# Patient Record
Sex: MALE | Race: WHITE | NOT HISPANIC OR LATINO | URBAN - METROPOLITAN AREA
[De-identification: names, ages, dates, MRNs, and addresses within clinical notes are randomized per-mention and may not be internally consistent; named-entity substitution may affect disease eponyms.]

---

## 2020-10-04 ENCOUNTER — INPATIENT (INPATIENT)
Facility: HOSPITAL | Age: 35
LOS: 3 days | Discharge: HOME | DRG: 853 | End: 2020-10-08
Attending: STUDENT IN AN ORGANIZED HEALTH CARE EDUCATION/TRAINING PROGRAM | Admitting: STUDENT IN AN ORGANIZED HEALTH CARE EDUCATION/TRAINING PROGRAM
Payer: COMMERCIAL

## 2020-10-04 VITALS
SYSTOLIC BLOOD PRESSURE: 139 MMHG | HEIGHT: 72 IN | DIASTOLIC BLOOD PRESSURE: 86 MMHG | TEMPERATURE: 100 F | WEIGHT: 169.98 LBS | HEART RATE: 114 BPM | OXYGEN SATURATION: 98 % | RESPIRATION RATE: 16 BRPM

## 2020-10-04 LAB
ALBUMIN SERPL ELPH-MCNC: 3 G/DL — LOW (ref 3.4–5)
ALBUMIN SERPL ELPH-MCNC: 3.5 G/DL — SIGNIFICANT CHANGE UP (ref 3.4–5)
ALP SERPL-CCNC: 166 U/L — HIGH (ref 40–120)
ALP SERPL-CCNC: 194 U/L — HIGH (ref 40–120)
ALT FLD-CCNC: 32 U/L — SIGNIFICANT CHANGE UP (ref 12–42)
ALT FLD-CCNC: 35 U/L — SIGNIFICANT CHANGE UP (ref 12–42)
ANION GAP SERPL CALC-SCNC: 12 MMOL/L — SIGNIFICANT CHANGE UP (ref 9–16)
ANION GAP SERPL CALC-SCNC: 14 MMOL/L — SIGNIFICANT CHANGE UP (ref 9–16)
APPEARANCE UR: CLEAR — SIGNIFICANT CHANGE UP
APTT BLD: 36.9 SEC — HIGH (ref 27.5–35.5)
AST SERPL-CCNC: 23 U/L — SIGNIFICANT CHANGE UP (ref 15–37)
AST SERPL-CCNC: 28 U/L — SIGNIFICANT CHANGE UP (ref 15–37)
BASOPHILS # BLD AUTO: 0 K/UL — SIGNIFICANT CHANGE UP (ref 0–0.2)
BASOPHILS # BLD AUTO: 0.03 K/UL — SIGNIFICANT CHANGE UP (ref 0–0.2)
BASOPHILS NFR BLD AUTO: 0 % — SIGNIFICANT CHANGE UP (ref 0–2)
BASOPHILS NFR BLD AUTO: 0.6 % — SIGNIFICANT CHANGE UP (ref 0–2)
BILIRUB SERPL-MCNC: 0.3 MG/DL — SIGNIFICANT CHANGE UP (ref 0.2–1.2)
BILIRUB SERPL-MCNC: 0.4 MG/DL — SIGNIFICANT CHANGE UP (ref 0.2–1.2)
BILIRUB UR-MCNC: NEGATIVE — SIGNIFICANT CHANGE UP
BUN SERPL-MCNC: 22 MG/DL — SIGNIFICANT CHANGE UP (ref 7–23)
BUN SERPL-MCNC: 24 MG/DL — HIGH (ref 7–23)
CALCIUM SERPL-MCNC: 10.7 MG/DL — HIGH (ref 8.5–10.5)
CALCIUM SERPL-MCNC: 9.6 MG/DL — SIGNIFICANT CHANGE UP (ref 8.5–10.5)
CHLORIDE SERPL-SCNC: 100 MMOL/L — SIGNIFICANT CHANGE UP (ref 96–108)
CHLORIDE SERPL-SCNC: 105 MMOL/L — SIGNIFICANT CHANGE UP (ref 96–108)
CK SERPL-CCNC: 86 U/L — SIGNIFICANT CHANGE UP (ref 39–308)
CO2 SERPL-SCNC: 23 MMOL/L — SIGNIFICANT CHANGE UP (ref 22–31)
CO2 SERPL-SCNC: 24 MMOL/L — SIGNIFICANT CHANGE UP (ref 22–31)
COLOR SPEC: YELLOW — SIGNIFICANT CHANGE UP
CREAT SERPL-MCNC: 1.91 MG/DL — HIGH (ref 0.5–1.3)
CREAT SERPL-MCNC: 1.94 MG/DL — HIGH (ref 0.5–1.3)
CRP SERPL-MCNC: >12 MG/DL — HIGH (ref 0–0.9)
DIFF PNL FLD: NEGATIVE — SIGNIFICANT CHANGE UP
EOSINOPHIL # BLD AUTO: 0.08 K/UL — SIGNIFICANT CHANGE UP (ref 0–0.5)
EOSINOPHIL # BLD AUTO: 0.09 K/UL — SIGNIFICANT CHANGE UP (ref 0–0.5)
EOSINOPHIL NFR BLD AUTO: 1.6 % — SIGNIFICANT CHANGE UP (ref 0–6)
EOSINOPHIL NFR BLD AUTO: 2 % — SIGNIFICANT CHANGE UP (ref 0–6)
GLUCOSE SERPL-MCNC: 100 MG/DL — HIGH (ref 70–99)
GLUCOSE SERPL-MCNC: 108 MG/DL — HIGH (ref 70–99)
GLUCOSE UR QL: NEGATIVE — SIGNIFICANT CHANGE UP
HCT VFR BLD CALC: 29.8 % — LOW (ref 39–50)
HCT VFR BLD CALC: 34.4 % — LOW (ref 39–50)
HGB BLD-MCNC: 11 G/DL — LOW (ref 13–17)
HGB BLD-MCNC: 9.5 G/DL — LOW (ref 13–17)
HIV 1 & 2 AB SERPL IA.RAPID: SIGNIFICANT CHANGE UP
IMM GRANULOCYTES NFR BLD AUTO: 1.8 % — HIGH (ref 0–1.5)
INR BLD: 1.16 — SIGNIFICANT CHANGE UP (ref 0.88–1.16)
KETONES UR-MCNC: NEGATIVE — SIGNIFICANT CHANGE UP
LACTATE SERPL-SCNC: 0.7 MMOL/L — SIGNIFICANT CHANGE UP (ref 0.4–2)
LEUKOCYTE ESTERASE UR-ACNC: NEGATIVE — SIGNIFICANT CHANGE UP
LYMPHOCYTES # BLD AUTO: 0.39 K/UL — LOW (ref 1–3.3)
LYMPHOCYTES # BLD AUTO: 0.41 K/UL — LOW (ref 1–3.3)
LYMPHOCYTES # BLD AUTO: 8.4 % — LOW (ref 13–44)
LYMPHOCYTES # BLD AUTO: 9 % — LOW (ref 13–44)
MCHC RBC-ENTMCNC: 26.7 PG — LOW (ref 27–34)
MCHC RBC-ENTMCNC: 26.8 PG — LOW (ref 27–34)
MCHC RBC-ENTMCNC: 31.9 GM/DL — LOW (ref 32–36)
MCHC RBC-ENTMCNC: 32 GM/DL — SIGNIFICANT CHANGE UP (ref 32–36)
MCV RBC AUTO: 83.7 FL — SIGNIFICANT CHANGE UP (ref 80–100)
MCV RBC AUTO: 83.7 FL — SIGNIFICANT CHANGE UP (ref 80–100)
MONOCYTES # BLD AUTO: 0.78 K/UL — SIGNIFICANT CHANGE UP (ref 0–0.9)
MONOCYTES # BLD AUTO: 1 K/UL — HIGH (ref 0–0.9)
MONOCYTES NFR BLD AUTO: 16 % — HIGH (ref 2–14)
MONOCYTES NFR BLD AUTO: 23 % — HIGH (ref 2–14)
NEUTROPHILS # BLD AUTO: 2.81 K/UL — SIGNIFICANT CHANGE UP (ref 1.8–7.4)
NEUTROPHILS # BLD AUTO: 3.49 K/UL — SIGNIFICANT CHANGE UP (ref 1.8–7.4)
NEUTROPHILS NFR BLD AUTO: 64 % — SIGNIFICANT CHANGE UP (ref 43–77)
NEUTROPHILS NFR BLD AUTO: 71.6 % — SIGNIFICANT CHANGE UP (ref 43–77)
NITRITE UR-MCNC: NEGATIVE — SIGNIFICANT CHANGE UP
NRBC # BLD: 0 /100 WBCS — SIGNIFICANT CHANGE UP (ref 0–0)
NRBC # BLD: SIGNIFICANT CHANGE UP /100 WBCS (ref 0–0)
PH UR: 6 — SIGNIFICANT CHANGE UP (ref 5–8)
PLATELET # BLD AUTO: 206 K/UL — SIGNIFICANT CHANGE UP (ref 150–400)
PLATELET # BLD AUTO: 245 K/UL — SIGNIFICANT CHANGE UP (ref 150–400)
POTASSIUM SERPL-MCNC: 3.7 MMOL/L — SIGNIFICANT CHANGE UP (ref 3.5–5.3)
POTASSIUM SERPL-MCNC: 4.1 MMOL/L — SIGNIFICANT CHANGE UP (ref 3.5–5.3)
POTASSIUM SERPL-SCNC: 3.7 MMOL/L — SIGNIFICANT CHANGE UP (ref 3.5–5.3)
POTASSIUM SERPL-SCNC: 4.1 MMOL/L — SIGNIFICANT CHANGE UP (ref 3.5–5.3)
PROT SERPL-MCNC: 7.2 G/DL — SIGNIFICANT CHANGE UP (ref 6.4–8.2)
PROT SERPL-MCNC: 8.3 G/DL — HIGH (ref 6.4–8.2)
PROT UR-MCNC: NEGATIVE MG/DL — SIGNIFICANT CHANGE UP
PROTHROM AB SERPL-ACNC: 13.8 SEC — HIGH (ref 10.6–13.6)
RBC # BLD: 3.56 M/UL — LOW (ref 4.2–5.8)
RBC # BLD: 4.11 M/UL — LOW (ref 4.2–5.8)
RBC # FLD: 16.2 % — HIGH (ref 10.3–14.5)
RBC # FLD: 16.3 % — HIGH (ref 10.3–14.5)
SARS-COV-2 RNA SPEC QL NAA+PROBE: SIGNIFICANT CHANGE UP
SODIUM SERPL-SCNC: 137 MMOL/L — SIGNIFICANT CHANGE UP (ref 132–145)
SODIUM SERPL-SCNC: 141 MMOL/L — SIGNIFICANT CHANGE UP (ref 132–145)
SP GR SPEC: 1.01 — SIGNIFICANT CHANGE UP (ref 1–1.03)
TROPONIN I SERPL-MCNC: <0.017 NG/ML — LOW (ref 0.02–0.06)
TSH SERPL-MCNC: 2.1 UIU/ML — SIGNIFICANT CHANGE UP (ref 0.36–3.74)
UROBILINOGEN FLD QL: 0.2 E.U./DL — SIGNIFICANT CHANGE UP
WBC # BLD: 4.33 K/UL — SIGNIFICANT CHANGE UP (ref 3.8–10.5)
WBC # BLD: 4.88 K/UL — SIGNIFICANT CHANGE UP (ref 3.8–10.5)
WBC # FLD AUTO: 4.33 K/UL — SIGNIFICANT CHANGE UP (ref 3.8–10.5)
WBC # FLD AUTO: 4.88 K/UL — SIGNIFICANT CHANGE UP (ref 3.8–10.5)

## 2020-10-04 PROCEDURE — 71260 CT THORAX DX C+: CPT | Mod: 26

## 2020-10-04 PROCEDURE — 93010 ELECTROCARDIOGRAM REPORT: CPT

## 2020-10-04 PROCEDURE — 74177 CT ABD & PELVIS W/CONTRAST: CPT | Mod: 26

## 2020-10-04 PROCEDURE — 99285 EMERGENCY DEPT VISIT HI MDM: CPT | Mod: CR

## 2020-10-04 PROCEDURE — 71045 X-RAY EXAM CHEST 1 VIEW: CPT | Mod: 26

## 2020-10-04 RX ORDER — ACETAMINOPHEN 500 MG
975 TABLET ORAL ONCE
Refills: 0 | Status: COMPLETED | OUTPATIENT
Start: 2020-10-04 | End: 2020-10-04

## 2020-10-04 RX ORDER — SODIUM CHLORIDE 9 MG/ML
2400 INJECTION INTRAMUSCULAR; INTRAVENOUS; SUBCUTANEOUS ONCE
Refills: 0 | Status: COMPLETED | OUTPATIENT
Start: 2020-10-04 | End: 2020-10-04

## 2020-10-04 RX ORDER — SODIUM CHLORIDE 9 MG/ML
1000 INJECTION, SOLUTION INTRAVENOUS ONCE
Refills: 0 | Status: COMPLETED | OUTPATIENT
Start: 2020-10-04 | End: 2020-10-04

## 2020-10-04 RX ORDER — CEFTRIAXONE 500 MG/1
1000 INJECTION, POWDER, FOR SOLUTION INTRAMUSCULAR; INTRAVENOUS ONCE
Refills: 0 | Status: COMPLETED | OUTPATIENT
Start: 2020-10-04 | End: 2020-10-04

## 2020-10-04 RX ADMIN — SODIUM CHLORIDE 2400 MILLILITER(S): 9 INJECTION INTRAMUSCULAR; INTRAVENOUS; SUBCUTANEOUS at 17:48

## 2020-10-04 RX ADMIN — SODIUM CHLORIDE 1000 MILLILITER(S): 9 INJECTION, SOLUTION INTRAVENOUS at 22:40

## 2020-10-04 RX ADMIN — CEFTRIAXONE 100 MILLIGRAM(S): 500 INJECTION, POWDER, FOR SOLUTION INTRAMUSCULAR; INTRAVENOUS at 18:05

## 2020-10-04 RX ADMIN — Medication 975 MILLIGRAM(S): at 17:51

## 2020-10-04 NOTE — ED PROVIDER NOTE - CLINICAL SUMMARY MEDICAL DECISION MAKING FREE TEXT BOX
Pt noted to be febrile and tachycardic upon arrival. Sepsis workup initiated, IVFs and IV ABX were ordered. Will follow labs and imaging, and continue to reassess.

## 2020-10-04 NOTE — ED ADULT NURSE NOTE - OBJECTIVE STATEMENT
Pt aox3.  Here for fevers, chills, cough, general malaise. Code sepsis called.  Pt rpts sx for approx 5 months, but cough worsening over last few days.

## 2020-10-04 NOTE — ED ADULT TRIAGE NOTE - CHIEF COMPLAINT QUOTE
covid testing- asymptomatic covid testing- asymptomatic (found to be febrile and tachycardic in triage-code sepsis initiated)

## 2020-10-04 NOTE — ED PROVIDER NOTE - OBJECTIVE STATEMENT
36 y/o M with no known significant PMH presents to the ED c/o a dry-to-productive cough for the past 5 months. He reports episodic substernal chest discomfort as well as SOB when coughing. He reports being seen outpatient for this in the past and was told it was likely related to "indigestion", though he has not had any imaging. While at work tonight, he was coughing more than usual so his coworkers became concerned and told him to go to the ER to be evaluated. He states that aside from the cough and chest discomfort he does not have any other associated symptoms or acute medical complaints at this time.    Denies fever, chills, dizziness, sore throat, dysphagia, drooling, palpitations, hemoptysis, abdo pain, N/V/D

## 2020-10-05 ENCOUNTER — TRANSCRIPTION ENCOUNTER (OUTPATIENT)
Age: 35
End: 2020-10-05

## 2020-10-05 DIAGNOSIS — R79.89 OTHER SPECIFIED ABNORMAL FINDINGS OF BLOOD CHEMISTRY: ICD-10-CM

## 2020-10-05 DIAGNOSIS — Z72.89 OTHER PROBLEMS RELATED TO LIFESTYLE: ICD-10-CM

## 2020-10-05 DIAGNOSIS — D64.9 ANEMIA, UNSPECIFIED: ICD-10-CM

## 2020-10-05 DIAGNOSIS — J18.9 PNEUMONIA, UNSPECIFIED ORGANISM: ICD-10-CM

## 2020-10-05 DIAGNOSIS — Z90.89 ACQUIRED ABSENCE OF OTHER ORGANS: Chronic | ICD-10-CM

## 2020-10-05 DIAGNOSIS — A41.9 SEPSIS, UNSPECIFIED ORGANISM: ICD-10-CM

## 2020-10-05 DIAGNOSIS — J98.59 OTHER DISEASES OF MEDIASTINUM, NOT ELSEWHERE CLASSIFIED: ICD-10-CM

## 2020-10-05 DIAGNOSIS — N17.9 ACUTE KIDNEY FAILURE, UNSPECIFIED: ICD-10-CM

## 2020-10-05 DIAGNOSIS — R63.8 OTHER SYMPTOMS AND SIGNS CONCERNING FOOD AND FLUID INTAKE: ICD-10-CM

## 2020-10-05 LAB
-  COAGULASE NEGATIVE STAPHYLOCOCCUS: SIGNIFICANT CHANGE UP
AFP-TM SERPL-MCNC: <1.8 NG/ML — SIGNIFICANT CHANGE UP
ALBUMIN SERPL ELPH-MCNC: 3.4 G/DL — SIGNIFICANT CHANGE UP (ref 3.3–5)
ALP SERPL-CCNC: 147 U/L — HIGH (ref 40–120)
ALT FLD-CCNC: 21 U/L — SIGNIFICANT CHANGE UP (ref 10–45)
ANION GAP SERPL CALC-SCNC: 11 MMOL/L — SIGNIFICANT CHANGE UP (ref 5–17)
APTT BLD: 32.6 SEC — SIGNIFICANT CHANGE UP (ref 27.5–35.5)
AST SERPL-CCNC: 19 U/L — SIGNIFICANT CHANGE UP (ref 10–40)
BASOPHILS # BLD AUTO: 0.02 K/UL — SIGNIFICANT CHANGE UP (ref 0–0.2)
BASOPHILS NFR BLD AUTO: 0.6 % — SIGNIFICANT CHANGE UP (ref 0–2)
BILIRUB SERPL-MCNC: 0.2 MG/DL — SIGNIFICANT CHANGE UP (ref 0.2–1.2)
BLD GP AB SCN SERPL QL: NEGATIVE — SIGNIFICANT CHANGE UP
BLD GP AB SCN SERPL QL: NEGATIVE — SIGNIFICANT CHANGE UP
BUN SERPL-MCNC: 15 MG/DL — SIGNIFICANT CHANGE UP (ref 7–23)
CALCIUM SERPL-MCNC: 9.2 MG/DL — SIGNIFICANT CHANGE UP (ref 8.4–10.5)
CHLORIDE SERPL-SCNC: 102 MMOL/L — SIGNIFICANT CHANGE UP (ref 96–108)
CO2 SERPL-SCNC: 23 MMOL/L — SIGNIFICANT CHANGE UP (ref 22–31)
CREAT SERPL-MCNC: 1.56 MG/DL — HIGH (ref 0.5–1.3)
CULTURE RESULTS: SIGNIFICANT CHANGE UP
EOSINOPHIL # BLD AUTO: 0.13 K/UL — SIGNIFICANT CHANGE UP (ref 0–0.5)
EOSINOPHIL NFR BLD AUTO: 3.7 % — SIGNIFICANT CHANGE UP (ref 0–6)
ERYTHROCYTE [SEDIMENTATION RATE] IN BLOOD: 30 MM/HR — HIGH
FERRITIN SERPL-MCNC: 1236 NG/ML — HIGH (ref 30–400)
GLUCOSE BLDC GLUCOMTR-MCNC: 110 MG/DL — HIGH (ref 70–99)
GLUCOSE SERPL-MCNC: 108 MG/DL — HIGH (ref 70–99)
GRAM STN FLD: SIGNIFICANT CHANGE UP
HBV SURFACE AB SER-ACNC: REACTIVE — SIGNIFICANT CHANGE UP
HBV SURFACE AG SER-ACNC: SIGNIFICANT CHANGE UP
HCG-TM SERPL-ACNC: <1 MIU/ML — SIGNIFICANT CHANGE UP
HCT VFR BLD CALC: 28.8 % — LOW (ref 39–50)
HCV AB S/CO SERPL IA: 0.1 S/CO — SIGNIFICANT CHANGE UP
HCV AB SERPL-IMP: SIGNIFICANT CHANGE UP
HGB BLD-MCNC: 9.2 G/DL — LOW (ref 13–17)
IMM GRANULOCYTES NFR BLD AUTO: 2.3 % — HIGH (ref 0–1.5)
INR BLD: 1.18 — HIGH (ref 0.88–1.16)
IRON SATN MFR SERPL: 12 % — LOW (ref 16–55)
IRON SATN MFR SERPL: 24 UG/DL — LOW (ref 45–165)
LDH SERPL L TO P-CCNC: 202 U/L — SIGNIFICANT CHANGE UP (ref 50–242)
LYMPHOCYTES # BLD AUTO: 0.37 K/UL — LOW (ref 1–3.3)
LYMPHOCYTES # BLD AUTO: 10.5 % — LOW (ref 13–44)
MAGNESIUM SERPL-MCNC: 1.8 MG/DL — SIGNIFICANT CHANGE UP (ref 1.6–2.6)
MCHC RBC-ENTMCNC: 26.5 PG — LOW (ref 27–34)
MCHC RBC-ENTMCNC: 31.9 GM/DL — LOW (ref 32–36)
MCV RBC AUTO: 83 FL — SIGNIFICANT CHANGE UP (ref 80–100)
METHOD TYPE: SIGNIFICANT CHANGE UP
MONOCYTES # BLD AUTO: 0.86 K/UL — SIGNIFICANT CHANGE UP (ref 0–0.9)
MONOCYTES NFR BLD AUTO: 24.3 % — HIGH (ref 2–14)
NEUTROPHILS # BLD AUTO: 2.08 K/UL — SIGNIFICANT CHANGE UP (ref 1.8–7.4)
NEUTROPHILS NFR BLD AUTO: 58.6 % — SIGNIFICANT CHANGE UP (ref 43–77)
NRBC # BLD: 0 /100 WBCS — SIGNIFICANT CHANGE UP (ref 0–0)
PHOSPHATE SERPL-MCNC: 3.3 MG/DL — SIGNIFICANT CHANGE UP (ref 2.5–4.5)
PLATELET # BLD AUTO: 205 K/UL — SIGNIFICANT CHANGE UP (ref 150–400)
POTASSIUM SERPL-MCNC: 3.8 MMOL/L — SIGNIFICANT CHANGE UP (ref 3.5–5.3)
POTASSIUM SERPL-SCNC: 3.8 MMOL/L — SIGNIFICANT CHANGE UP (ref 3.5–5.3)
PROT SERPL-MCNC: 6.6 G/DL — SIGNIFICANT CHANGE UP (ref 6–8.3)
PROTHROM AB SERPL-ACNC: 14.1 SEC — HIGH (ref 10.6–13.6)
RBC # BLD: 3.47 M/UL — LOW (ref 4.2–5.8)
RBC # FLD: 16.6 % — HIGH (ref 10.3–14.5)
RH IG SCN BLD-IMP: NEGATIVE — SIGNIFICANT CHANGE UP
RH IG SCN BLD-IMP: NEGATIVE — SIGNIFICANT CHANGE UP
SODIUM SERPL-SCNC: 136 MMOL/L — SIGNIFICANT CHANGE UP (ref 135–145)
SPECIMEN SOURCE: SIGNIFICANT CHANGE UP
SPECIMEN SOURCE: SIGNIFICANT CHANGE UP
TIBC SERPL-MCNC: 198 UG/DL — LOW (ref 220–430)
TRANSFERRIN SERPL-MCNC: 160 MG/DL — LOW (ref 200–360)
UIBC SERPL-MCNC: 174 UG/DL — SIGNIFICANT CHANGE UP (ref 110–370)
WBC # BLD: 3.54 K/UL — LOW (ref 3.8–10.5)
WBC # FLD AUTO: 3.54 K/UL — LOW (ref 3.8–10.5)

## 2020-10-05 PROCEDURE — 78815 PET IMAGE W/CT SKULL-THIGH: CPT | Mod: 26

## 2020-10-05 PROCEDURE — 99223 1ST HOSP IP/OBS HIGH 75: CPT | Mod: GC

## 2020-10-05 PROCEDURE — 76870 US EXAM SCROTUM: CPT | Mod: 26

## 2020-10-05 RX ORDER — ACETAMINOPHEN 500 MG
650 TABLET ORAL EVERY 6 HOURS
Refills: 0 | Status: DISCONTINUED | OUTPATIENT
Start: 2020-10-05 | End: 2020-10-08

## 2020-10-05 RX ORDER — CEFTRIAXONE 500 MG/1
1000 INJECTION, POWDER, FOR SOLUTION INTRAMUSCULAR; INTRAVENOUS EVERY 24 HOURS
Refills: 0 | Status: DISCONTINUED | OUTPATIENT
Start: 2020-10-05 | End: 2020-10-05

## 2020-10-05 RX ORDER — AZITHROMYCIN 500 MG/1
500 TABLET, FILM COATED ORAL EVERY 24 HOURS
Refills: 0 | Status: COMPLETED | OUTPATIENT
Start: 2020-10-05 | End: 2020-10-07

## 2020-10-05 RX ORDER — SODIUM CHLORIDE 9 MG/ML
1000 INJECTION INTRAMUSCULAR; INTRAVENOUS; SUBCUTANEOUS
Refills: 0 | Status: DISCONTINUED | OUTPATIENT
Start: 2020-10-05 | End: 2020-10-08

## 2020-10-05 RX ORDER — SODIUM CHLORIDE 9 MG/ML
3 INJECTION INTRAMUSCULAR; INTRAVENOUS; SUBCUTANEOUS EVERY 6 HOURS
Refills: 0 | Status: COMPLETED | OUTPATIENT
Start: 2020-10-05 | End: 2020-10-06

## 2020-10-05 RX ORDER — CEFTRIAXONE 500 MG/1
1000 INJECTION, POWDER, FOR SOLUTION INTRAMUSCULAR; INTRAVENOUS EVERY 24 HOURS
Refills: 0 | Status: DISCONTINUED | OUTPATIENT
Start: 2020-10-05 | End: 2020-10-08

## 2020-10-05 RX ORDER — HEPARIN SODIUM 5000 [USP'U]/ML
5000 INJECTION INTRAVENOUS; SUBCUTANEOUS EVERY 12 HOURS
Refills: 0 | Status: DISCONTINUED | OUTPATIENT
Start: 2020-10-05 | End: 2020-10-05

## 2020-10-05 RX ORDER — ACETAMINOPHEN 500 MG
650 TABLET ORAL EVERY 6 HOURS
Refills: 0 | Status: DISCONTINUED | OUTPATIENT
Start: 2020-10-05 | End: 2020-10-05

## 2020-10-05 RX ADMIN — Medication 650 MILLIGRAM(S): at 02:53

## 2020-10-05 RX ADMIN — CEFTRIAXONE 100 MILLIGRAM(S): 500 INJECTION, POWDER, FOR SOLUTION INTRAMUSCULAR; INTRAVENOUS at 19:12

## 2020-10-05 RX ADMIN — SODIUM CHLORIDE 110 MILLILITER(S): 9 INJECTION INTRAMUSCULAR; INTRAVENOUS; SUBCUTANEOUS at 05:26

## 2020-10-05 RX ADMIN — SODIUM CHLORIDE 110 MILLILITER(S): 9 INJECTION INTRAMUSCULAR; INTRAVENOUS; SUBCUTANEOUS at 14:36

## 2020-10-05 RX ADMIN — AZITHROMYCIN 500 MILLIGRAM(S): 500 TABLET, FILM COATED ORAL at 05:27

## 2020-10-05 NOTE — CONSULT NOTE ADULT - SUBJECTIVE AND OBJECTIVE BOX
Surgeon: Dr. Elaine     Requesting Physician: Dr. Temple     HISTORY OF PRESENT ILLNESS:  35 year old male, former smoker, with no significant PMHx presented to [facility] ED yesterday evening complaining on non productive cough x 5 months. Patient states that over the past 5 months he has had a non productive cough with associated chest pain that has not improved. States the chest pain is pressure in nature, denies any radiation of the pain or associated diaphoresis, palpitations, nausea or vomiting. Also endorses intermittent R shoulder and bilateral neck pain that is exacerbated by his coughing. He also endorses night sweats for the past 2 days, denies any fever or chills at home and has been taking his temperature daily at his place of work. He also endorses 10lb weight loss which he attributed to eating healthier during the pandemic. States he has tried to seek medical care for this cough and chest pain over the past 5 months but was told it was likely 2/2 GERD and denied CXR at [facility]. 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 In the ED patient was found to be febrile and tachycardic, COVID swab negative, labs significant for leukopenia and anemia. Patient has CXR which demonstrated mediastinal mass and subsequently underwent CTA which revealed 6cm mediastinal mass with extensive mediastinal lymphadenopathy concerning for lymphoma. He was admitted to medicine for further evaluation. Thoracic surgery consulted for surgical evaluation. Patient currently denies any chest pain, shortness of breath, abdominal pain, nausea, vomiting, change in bowel habits, hematuria, hematochezia, recent COVID infection, COVID antibodies, recent travel or exposure to anyone sick.       PAST MEDICAL & SURGICAL HISTORY:  No pertinent past medical history    History of tonsillectomy        MEDICATIONS  (STANDING):  azithromycin   Tablet 500 milliGRAM(s) Oral every 24 hours  cefTRIAXone   IVPB 1000 milliGRAM(s) IV Intermittent every 24 hours  sodium chloride 0.9%. 1000 milliLiter(s) (110 mL/Hr) IV Continuous <Continuous>    MEDICATIONS  (PRN):  acetaminophen   Tablet .. 650 milliGRAM(s) Oral every 6 hours PRN Temp greater or equal to 38C (100.4F)      Allergies    No Known Allergies    Intolerances        SOCIAL HISTORY:  Smoker:  Former, quit 2-3 years ago. 20 pack year history x 1ppd. Recently started smoking cigarettes again 1-2 per week.   ETOH use:  Daily whiskey 2-3 glasses per day. Denies any withdrawal symptoms   Ilicit Drug use: Former cocaine use, last used 1 year ago   Occupation: Works at a restaurant   Assisted device use (Cane / Walker): None   Live with: Girlfriend     FAMILY HISTORY:  FH: lung cancer (Aunt)        Review of Systems:  CONSTITUTIONAL: Denies fevers / chills, sweats, fatigue, weight loss, weight gain                                       NEURO:  Denies parathesias, seizures, syncope, confusion                                                                                  EYES:  Denies blurry vision, discharge, pain, loss of vision                                                                                    ENMT:  Denies difficulty hearing, vertigo, dysphagia, epistaxis, recent dental work                                       CV:  Denies chest pain, palpitations, GILES, orthopnea                                                                                           RESPIRATORY:  Denies wWheezing, SOB, cough / sputum, hemoptysis                                                               GI:  Denies nausea, vomiting, diarrhea, constipation, melena                                                                          : Denies hematuria, dysuria, urgency, incontinence                                                                                          MUSKULOSKELETAL:  Denies arthritis, joint swelling, muscle weakness                                                             SKIN/BREAST:  Denies rash, itching, hair loss, masses                                                                                              PSYCH:  Denies depression, anxiety, suicidal ideation                                                                                                HEME/LYMPH:  Denies bruises easily, enlarged lymph nodes, tender lymph nodes                                          ENDOCRINE:  Denies cold intolerance, heat intolerance, polydipsia                                                                      Vital Signs Last 24 Hrs  T(C): 37.9 (05 Oct 2020 02:07), Max: 38.6 (04 Oct 2020 17:39)  T(F): 100.3 (05 Oct 2020 02:07), Max: 101.5 (04 Oct 2020 17:39)  HR: 108 (05 Oct 2020 02:07) (89 - 114)  BP: 144/75 (05 Oct 2020 02:07) (124/78 - 148/87)  BP(mean): --  RR: 18 (05 Oct 2020 02:07) (16 - 22)  SpO2: 99% (05 Oct 2020 02:07) (96% - 99%)    Physical Exam   CONSTITUTIONAL:  Patient lying comfortably in bed, no acute distress   NEURO:  Alert and oriented. UE and LE strength 5/5 bilaterally. No focal neurological deficits   EYES:  WNL  ENMT:   WNL  CV:     S1S2, RRR, no murmurs appreciated on exam   RESPIRATORY: Coarse breath sounds, bilaterally wheezing appreciated.   GI:  Abdomen soft, non tender, non distended   : No friedman, exam deferred   MUSKULOSKELETAL: MONAHAN, ROM Intact   SKIN / BREAST:   No rashes noted   Extremities: warm and well perfused. No edema or calf tenderness   Vascular: Radial and DP pulses 2+ bilaterally                                                           LABS:                        9.5    4.33  )-----------( 206      ( 04 Oct 2020 21:43 )             29.8     10    141  |  105  |  22  ----------------------------<  100<H>  4.1   |  24  |  1.94<H>    Ca    9.6      04 Oct 2020 21:43    TPro  7.2  /  Alb  3.0<L>  /  TBili  0.3  /  DBili  x   /  AST  23  /  ALT  32  /  AlkPhos  166<H>  10-04    PT/INR - ( 04 Oct 2020 17:48 )   PT: 13.8 sec;   INR: 1.16          PTT - ( 04 Oct 2020 17:48 )  PTT:36.9 sec  Urinalysis Basic - ( 04 Oct 2020 20:01 )    Color: Yellow / Appearance: Clear / S.010 / pH: x  Gluc: x / Ketone: NEGATIVE  / Bili: NEGATIVE / Urobili: 0.2 E.U./dL   Blood: x / Protein: NEGATIVE mg/dL / Nitrite: NEGATIVE   Leuk Esterase: NEGATIVE / RBC: x / WBC x   Sq Epi: x / Non Sq Epi: x / Bacteria: x      CARDIAC MARKERS ( 04 Oct 2020 22:18 )  <0.017 ng/mL / x     / 86 U/L / x     / x        RADIOLOGY & ADDITIONAL STUDIES:    CXR: Awaiting official read, mediastinal mass noted.     CT Scan:  < from: CT Chest w/ IV Cont (10.04.20 @ 22:30) >  Impression:  1. Extensive mass like mediastinal lymphadenopathy with the largest measuring up to 6.6 cm. Additional more mild lymphadenopathy seen in the upper abdomen. Findings are highly suspicious for malignancy such as lymphoma.  Less likely  but in the differential are entities such as sarcoidosis.    2. Ill-defined hypodensities in the liver suspicious for metastases.    3. Questionable hypodensity in the spleen which could be possible metastatic lesion vs imaging artifact.    < end of copied text >

## 2020-10-05 NOTE — DISCHARGE NOTE PROVIDER - CARE PROVIDER_API CALL
Denzel De Jesus  HEMATOLOGY  [address], [address]  [place] [address]  Phone: [phone]  Fax: [phone]  Scheduled Appointment: 10/15/2020 02:15 PM

## 2020-10-05 NOTE — H&P ADULT - PROBLEM SELECTOR PLAN 2
Pt with 5 month history of cough and no prior imaging or workup. Differential includes lymphoma, thymic tumor, germ cell tumor, sarcoidosis.  -hem/onc consult Pt with 5 month history of cough and no prior imaging or workup. Differential includes lymphoma, thymic tumor, germ cell tumor, sarcoidosis.  -hem/onc consult in am  -pulm consult in am  -CT surg consult for possible bx of mediastinal mass  -antiacetylcholine receptor abs (to r/o thymic tumor)  -alpha-fetoprotein (to r/o germ cell tumor)  -b-HCG (to r/o germ cell tumor)  -LDH (lymphoma)  -ACE Pt with 5 month history of cough and no prior imaging or workup. Differential includes lymphoma, thymic tumor, germ cell tumor, sarcoidosis.  -hem/onc consult in am  -pulm consult in am  -CT surg consult for possible bx of mediastinal mass (already contacted, appreciate recs)  -antiacetylcholine receptor abs (to r/o thymic tumor)  -alpha-fetoprotein (to r/o germ cell tumor)  -b-HCG (to r/o germ cell tumor)  -LDH (lymphoma)  -serum ACE level (r/o sarcoidosis) Pt with 5 month history of cough and no prior imaging or workup. Differential includes lymphoma, thymic tumor, germ cell tumor, sarcoidosis.  -hem/onc consult in am (please call Dr. De Jesus for new consult [phone])  -pulm consult in am  -CT surg consult for possible bx of mediastinal mass (already contacted, Dr. Toure will speak to IR first thing this morning, appreciate further recs)  -testicular ultrasound  -antiacetylcholine receptor abs (to r/o thymic tumor)  -alpha-fetoprotein (to r/o germ cell tumor)  -b-HCG (to r/o germ cell tumor)  -LDH (lymphoma)  -serum ACE level (r/o sarcoidosis)  -keep pt NPO for potential biopsy

## 2020-10-05 NOTE — H&P ADULT - PROBLEM SELECTOR PLAN 1
On admission, pt met 2/4 SIRS criteria for Tmax 101.5 and , lactate wnl. Received 975mg Tylenol, 1g Ceftriaxone, 1L LR and 2.4L NS. Fever and tachycardia likely 2/2 mediastinal mass/malignant process, less likely 2/2 infection as there's no obvious infectious etiology. HIV negative, COVID negative.  -will hold off on further antibiotics, restart as indicated  -Tylenol q6h PRN for fever  -f/u BCx, UCx On admission, pt met 2/4 SIRS criteria for Tmax 101.5 and , lactate wnl. Received 975mg Tylenol, 1g Ceftriaxone, 1L LR and 2.4L NS. Fever and tachycardia likely 2/2 mediastinal mass/malignant process, less likely 2/2 infection as there's no obvious infectious etiology. HIV negative, COVID negative.  -will treat for CAP with Ceftriaxone 1mg and azithromycin 500mg x3d   -Tylenol q6h PRN for fever  -f/u BCx, UCx On admission, pt met 2/4 SIRS criteria for Tmax 101.5 and , lactate wnl. Received 975mg Tylenol, 1g Ceftriaxone, 1L LR and 2.4L NS. Fever and tachycardia likely 2/2 mediastinal mass/malignant process, less likely 2/2 infection as there's no obvious infectious etiology. HIV negative, COVID negative.  -will treat for CAP with Ceftriaxone 1mg and azithromycin 500mg x3d   -Tylenol q6h PRN for fever  -f/u BCx, UCx    ADDENDUM: treat for possible superimposed PNA On admission, pt met 2/4 SIRS criteria for Tmax 101.5 and , lactate wnl. Received 975mg Tylenol, 1g Ceftriaxone, 1L LR and 2.4L NS. Fever and tachycardia likely 2/2 mediastinal mass/malignant process, less likely 2/2 infection as there's no obvious infectious etiology. HIV negative, COVID negative.  -will treat for CAP with Ceftriaxone 1mg and azithromycin 500mg x3d   -Tylenol q6h PRN for fever  -f/u BCx, UCx, sputum Cx  -r/o TB (f/u quantiferon, sputum AFB x3 - may need sputum induction)    ADDENDUM: treat for possible superimposed PNA

## 2020-10-05 NOTE — H&P ADULT - HISTORY OF PRESENT ILLNESS
35M with no known significant PMH presents to the ED c/o a dry-to-productive cough for the past 5 months. He reports episodic substernal chest discomfort as well as SOB when coughing. He reports being seen outpatient for this in the past and was told it was likely related to "indigestion", though he has not had any imaging. While at work tonight, he was coughing more than usual so his coworkers became concerned and told him to go to the ER to be evaluated. He states that aside from the cough and chest discomfort he does not have any other associated symptoms or acute medical complaints at this time.    Denies fever, chills, dizziness, sore throat, dysphagia, drooling, palpitations, hemoptysis, abdo pain, N/V/D   35M with no known significant PMH presents to the ED c/o a dry-to-productive cough for the past 5 months. He reports episodic substernal chest discomfort as well as SOB when coughing. He reports being seen outpatient for this in the past and was told it was likely related to "indigestion", though he has not had any imaging. While at work tonight, he was coughing more than usual so his coworkers became concerned and told him to go to the ER to be evaluated. He states that aside from the cough and chest discomfort he does not have any other associated symptoms or acute medical complaints at this time. Denies fever, chills, dizziness, sore throat, dysphagia, drooling, palpitations, hemoptysis, abdo pain, N/V/D    ED vitals: Tmax 101.5F, HR 100s BP 120s-140s/80s RR 16 96-98% RA  Labs s/f anemia Hgb 9.5, lymphopenia, elevated monocytes, Cr 1.94, Alk phos 166, lactate wnl, HIV- COVID-  CXR: large mediastinal mass  CT C/A/P with contrast:   1. Extensive mass like mediastinal lymphadenopathy with the largest measuring up to 6.6 cm. Additional more mild lymphadenopathy seen in the upper abdomen. Findings are highly suspicious for malignancy such as lymphoma.  Less likely  but in the differential are entities such as sarcoidosis.  2. Ill-defined hypodensities in the liver suspicious for metastases.  3. Questionable hypodensity in the spleen which could be possible metastatic lesion vs imaging artifact.  ED course: 975mg Tylenol, 1g Ceftriaxone, 1L LR, 2.4L NS 35M with no known significant PMH presents to the ED c/o an intermittently dry-to-productive cough (minimal white sputum) for the past 5 months. He reports episodic substernal chest discomfort. He reports being seen outpatient for this in the past and was told it was likely related to "indigestion", though he has not had any imaging. While at work tonight, he was coughing more than usual so his coworkers became concerned and told him to go to the ER to be evaluated. He states that aside from the cough and chest discomfort he does not have any other associated symptoms or acute medical complaints at this time. He noticed ~10lb weight loss since onset of symptoms. Denies fever, chills, dizziness, sore throat, dysphagia, drooling, palpitations, hemoptysis, abd pain, N/V/D/C, hematochezia, melena, myalgias, arthralgias, recent travel, exposure to livestock, sick contacts.     ED vitals: Tmax 101.5F, HR 100s BP 120s-140s/80s RR 16 96-98% RA  Labs s/f anemia Hgb 9.5, lymphopenia, elevated monocytes, Cr 1.94, Alk phos 166, lactate wnl, HIV- COVID-  CXR: large mediastinal mass  CT C/A/P with contrast:   1. Extensive mass like mediastinal lymphadenopathy with the largest measuring up to 6.6 cm. Additional more mild lymphadenopathy seen in the upper abdomen. Findings are highly suspicious for malignancy such as lymphoma.  Less likely  but in the differential are entities such as sarcoidosis.  2. Ill-defined hypodensities in the liver suspicious for metastases.  3. Questionable hypodensity in the spleen which could be possible metastatic lesion vs imaging artifact.  ED course: 975mg Tylenol, 1g Ceftriaxone, 1L LR, 2.4L NS 35M with no known significant PMH presents to the ED c/o an intermittently dry-to-productive cough (minimal white sputum) for the past 5 months. He reports episodic substernal chest discomfort. He reports being seen outpatient for this in the past and was told it was likely related to "indigestion", though he has not had any imaging. While at work tonight, he was coughing more than usual so his coworkers became concerned and told him to go to the ER to be evaluated. He states that aside from the cough and chest discomfort he does not have any other associated symptoms or acute medical complaints at this time. He noticed ~10lb weight loss since onset of symptoms. Denies fever, chills, night sweats, dizziness, sore throat, dysphagia, drooling, palpitations, hemoptysis, abd pain, N/V/D/C, hematochezia, melena, myalgias, arthralgias, recent travel, exposure to livestock/exotic animals, sick contacts.     ED vitals: Tmax 101.5F, HR 100s BP 120s-140s/80s RR 16 96-98% RA  Labs s/f anemia Hgb 9.5, lymphopenia, elevated monocytes, Cr 1.94, Alk phos 166, lactate wnl, HIV- COVID-  CXR: large mediastinal mass  CT C/A/P with contrast:   1. Extensive mass like mediastinal lymphadenopathy with the largest measuring up to 6.6 cm. Additional more mild lymphadenopathy seen in the upper abdomen. Findings are highly suspicious for malignancy such as lymphoma.  Less likely  but in the differential are entities such as sarcoidosis.  2. Ill-defined hypodensities in the liver suspicious for metastases.  3. Questionable hypodensity in the spleen which could be possible metastatic lesion vs imaging artifact.  ED course: 975mg Tylenol, 1g Ceftriaxone, 1L LR, 2.4L NS

## 2020-10-05 NOTE — H&P ADULT - PROBLEM SELECTOR PLAN 4
Pt with Cr 1.94 (unknown baseline, however also s/p CT C/A/P with contrast).  -monitor  -hydrate with IV fluids as needed  -avoid nephrotoxic medications Pt with Cr 1.94 (unknown baseline, however also s/p CT C/A/P with contrast).  -monitor  -maintenance fluids  -avoid nephrotoxic medications

## 2020-10-05 NOTE — H&P ADULT - ATTENDING COMMENTS
patient seen and examined overnight     reviewed pertinent data , h&p    PE findings as above, + mild expiratory wheezing at RUL on posterior and anterior regions    1. sepsis/ mediastinal mass: will c/w abx for possible post obstructive process; will need pulm, onc and CT surgery evaluations and biopsy        rest of  plan as above, with noted addenda

## 2020-10-05 NOTE — H&P ADULT - NSHPLABSRESULTS_GEN_ALL_CORE
LABS:                        9.5    4.33  )-----------( 206      ( 04 Oct 2020 21:43 )             29.8     10-04    141  |  105  |  22  ----------------------------<  100<H>  4.1   |  24  |  1.94<H>    Ca    9.6      04 Oct 2020 21:43    TPro  7.2  /  Alb  3.0<L>  /  TBili  0.3  /  DBili  x   /  AST  23  /  ALT  32  /  AlkPhos  166<H>  1004    PT/INR - ( 04 Oct 2020 17:48 )   PT: 13.8 sec;   INR: 1.16          PTT - ( 04 Oct 2020 17:48 )  PTT:36.9 sec  Lactate, Blood: 0.7 mmoL/L (10-04-20 @ 17:48)    CARDIAC MARKERS ( 04 Oct 2020 22:18 )  <0.017 ng/mL / x     / 86 U/L / x     / x          Urinalysis Basic - ( 04 Oct 2020 20:01 )    Color: Yellow / Appearance: Clear / S.010 / pH: x  Gluc: x / Ketone: NEGATIVE  / Bili: NEGATIVE / Urobili: 0.2 E.U./dL   Blood: x / Protein: NEGATIVE mg/dL / Nitrite: NEGATIVE   Leuk Esterase: NEGATIVE / RBC: x / WBC x   Sq Epi: x / Non Sq Epi: x / Bacteria: x          EKG: Reviewed.    RADIOLOGY & ADDITIONAL TESTS: Reviewed.

## 2020-10-05 NOTE — PROGRESS NOTE ADULT - PROBLEM SELECTOR PLAN 1
CT shows "extensive mass-like mediastinal lymphadenopathy with the largest measuring up to 6.6 cm" w/ additional LAD in the upper abdomen, and "ill-defined hypodensities in the liver suspicious for metastases" concerning for lymphoma; appreciate CTS recs; plan for IR bx; Heme-Onc consulted, additional work-up per recs (e.g. tumor markers, testicular U/S, PET-CT)

## 2020-10-05 NOTE — PROGRESS NOTE ADULT - PROBLEM SELECTOR PLAN 2
On admission pt met 2/4 SIRS criteria (T 101,5F, ) possibly due to post-obstructive PNA  - no risk factors for TB; f/u quantiferon  - continue CTX 1000mg + Azithromycin 500mg  - f/u blood cx, sputum cx  - f/u quantiferon  - cont. airborne isolation pending 3 negative AFB sputum smears (pt requires sputum induction - sodium chloride 3% inhalation)

## 2020-10-05 NOTE — H&P ADULT - NSHPPHYSICALEXAM_GEN_ALL_CORE
VITAL SIGNS:  ICU Vital Signs Last 24 Hrs  T(C): 37.2 (05 Oct 2020 01:28), Max: 38.6 (04 Oct 2020 17:39)  T(F): 99 (05 Oct 2020 01:28), Max: 101.5 (04 Oct 2020 17:39)  HR: 100 (05 Oct 2020 01:28) (89 - 114)  BP: 148/87 (05 Oct 2020 01:28) (124/78 - 148/87)  BP(mean): --  ABP: --  ABP(mean): --  RR: 22 (05 Oct 2020 01:28) (16 - 22)  SpO2: 96% (05 Oct 2020 01:28) (96% - 98%)    CAPILLARY BLOOD GLUCOSE    PHYSICAL EXAM:  Constitutional: resting comfortably in bed, NAD  HEENT: NC/AT; PERRL, anicteric sclera; no oropharyngeal erythema or exudates; MMM  Neck: supple, no appreciable JVD, no palpable neck masses  Respiratory: expiratory wheeze appreciated on posterior lung fields R>L, conversant in full sentences, no accessory muscle use, no labored breathing  Cardiovascular: +S1/S2, rapid rate, regular rhythm, no m/r/g  Gastrointestinal: abdomen soft, NT/ND, 4+BS  Extremities: WWP; no clubbing, cyanosis or edema  Vascular: 2+ radial, femoral, and DP/PT pulses B/L  Dermatologic: skin normal color and turgor; no visible rashes  Neurological: AAOx3, cranial nerves grossly intact, no focal neurological deficits appreciated VITAL SIGNS:  ICU Vital Signs Last 24 Hrs  T(C): 37.2 (05 Oct 2020 01:28), Max: 38.6 (04 Oct 2020 17:39)  T(F): 99 (05 Oct 2020 01:28), Max: 101.5 (04 Oct 2020 17:39)  HR: 100 (05 Oct 2020 01:28) (89 - 114)  BP: 148/87 (05 Oct 2020 01:28) (124/78 - 148/87)  BP(mean): --  ABP: --  ABP(mean): --  RR: 22 (05 Oct 2020 01:28) (16 - 22)  SpO2: 96% (05 Oct 2020 01:28) (96% - 98%)    CAPILLARY BLOOD GLUCOSE    PHYSICAL EXAM:  Constitutional: resting comfortably in bed, NAD  HEENT: NC/AT; PERRL, anicteric sclera; no oropharyngeal erythema or exudates; MMM  Neck: supple, no appreciable JVD, no palpable neck masses  Respiratory: expiratory wheeze appreciated on posterior lung fields R>L, conversant in full sentences, no accessory muscle use, no labored breathing  Cardiovascular: +S1/S2, rapid rate, regular rhythm, no m/r/g  Gastrointestinal: abdomen soft, NT/ND, 4+BS, no splenomegaly  Extremities: WWP; no clubbing, cyanosis or edema  Vascular: 2+ radial, femoral, and DP/PT pulses B/L  Dermatologic: skin normal color and turgor; no visible rashes  Neurological: AAOx3, cranial nerves grossly intact, no focal neurological deficits appreciated

## 2020-10-05 NOTE — H&P ADULT - ASSESSMENT
35M with no known significant PMH presents to the ED with cough [id number], found to have a mediastinal mass and meeting 2/4 SIRS criteria for fever and tachycardia, admitted for further workup of mediastinal mass.

## 2020-10-05 NOTE — CONSULT NOTE ADULT - ASSESSMENT
Assesment:  35 year old male, former smoker, with no significant PMHx presented to [facility] ED yesterday evening complaining on non productive cough x 5 months. In the ED patient was found to be febrile and tachycardic, COVID swab negative, labs significant for leukopenia and anemia. Patient has CXR which demonstrated mediastinal mass and subsequently underwent CTA which revealed 6cm mediastinal mass with extensive mediastinal lymphadenopathy concerning for lymphoma. He was admitted to medicine for further evaluation. Thoracic surgery consulted for surgical evaluation.     Plan:  Problem 1: Mediastinal mass   - Mediastinal mass with associated B symptoms concerning for malignancy (lymphoma)   - Please obtain LDH, Thyroid function panel, Alpha-feto protein, Beta-HCG, Acetylcholine receptor antibody   - Please obtain testicular ultrasound   - Keep NPO for possible IR biopsy today   - Please consult Dr. De Jesus from oncology     Problem 2: Acute kidney injury   - Cr on admission 1.9, unclear baseline.   - Continue IV hydration given recent CT with contrast   - Management per primary team  - Consider renal consult     Problem 3: r/o COVID   - COVID swab negative 10/4     Problem 4: Daily ETOH use  - Monitor for signs of withdrawal  - Management per primary team     I have reviewed clinical labs tests and reports, radiology tests and reports, as well as old patient medical records, and discussed with the referring physician.

## 2020-10-05 NOTE — H&P ADULT - PROBLEM SELECTOR PLAN 5
F: none  E: replete PRN  N: regular diet  DVT ppx: HSQ  Dispo: [facility]  CODE: Full F: 110cc/hr NS  E: replete PRN  N: NPO for possible procedure  DVT ppx: holding for now in case pt undergoes procedure  Dispo: [facility]  CODE: Full ADDENDUM: reports drinking at work, about 4 drinks per night, usually shots, mixed drinks, denies w/d sxs, CIWA 0, has not had w/d during COVID pandemic when pt was off work. monitor CIWA

## 2020-10-05 NOTE — PATIENT PROFILE ADULT - FLU SEASON?
Call back to patient. Left message for patient to call back, please terence Fan or an available RN.     Yes...

## 2020-10-05 NOTE — DISCHARGE NOTE PROVIDER - HOSPITAL COURSE
#DISCHARGE - DO NOT DELETE    Patient is 36 yo M with no known significant past medical history presented to [facility] ED on 10/4 with cough x 5 months.     Problem List/Main Diagnoses (system-based):   Inpatient treatment course: Please review above  New medications:   Labs to be followed outpatient:   Exam to be followed outpatient:    #DISCHARGE - DO NOT DELETE    Patient is 34 yo M with no known significant past medical history presented to [facility] ED on 10/4 with cough x 5 months. He reports episodic substernal chest discomfort. He reports being seen outpatient for this in the past and was told it was likely related to "indigestion", though he has not had any imaging. While at work tonight, he was coughing more than usual so his coworkers became concerned and told him to go to the ER to be evaluated. He states that aside from the cough and chest discomfort he does not have any other associated symptoms or acute medical complaints at this time. He noticed ~10lb weight loss since onset of symptoms. Denies fever, chills, night sweats, dizziness, sore throat, dysphagia, drooling, palpitations, hemoptysis, abd pain, N/V/D/C, hematochezia, melena, myalgias, arthralgias, recent travel, exposure to livestock/exotic animals, sick contacts.       Problem List/Main Diagnoses (system-based):   1. Mediastinal Mass  On presentation to ED, CXR revealed   - CT shows "extensive mass-like mediastinal lymphadenopathy with the largest measuring up to 6.6 cm w/ additional LAD in the upper abdomen", and "ill-defined hypodensities in the liver suspicious for metastases" concerning for lymphoma.   - PET Scan (performed 10/6) shows diffuse mediastinal and B/L hilar lymphadenopathy. Increased FDG uptake w/ liver/spleen/osseous lesions suspicious for lymphoma. CT Surgery, IR and Heme/onc consulted.   - Testicular US revealed symmetrical homogenous testes without intratesticular mass   - supraclavicular lymph node FNA + core biopsy to be performed by IR pending 3 neg AFB and removal of airborne precautions; likely 10/7/2020  - do not suspect tuberculosis, as AFBx3 demonstrates no acid fast bacilli   - bHCG WNL, AFP WNL, Heb B ab reactive, Hep C virus non-reactive  - ESR 30.     2.       Inpatient treatment course: Please review above  New medications: None  Labs to be followed outpatient: Glucose-6-phosphate dehydrogenase; ECHO   Exam to be followed outpatient: None    #DISCHARGE - DO NOT DELETE    Patient is 36 yo M with no known significant past medical history presented to [facility] ED on 10/4 with cough x 5 months. He reports episodic substernal chest discomfort. He reports being seen outpatient for this in the past and was told it was likely related to "indigestion", though he has not had any imaging. While at work tonight, he was coughing more than usual so his coworkers became concerned and told him to go to the ER to be evaluated. He states that aside from the cough and chest discomfort he does not have any other associated symptoms or acute medical complaints at this time. He noticed ~10lb weight loss since onset of symptoms. Denies fever, chills, night sweats, dizziness, sore throat, dysphagia, drooling, palpitations, hemoptysis, abd pain, N/V/D/C, hematochezia, melena, myalgias, arthralgias, recent travel, exposure to livestock/exotic animals, sick contacts.       Problem List/Main Diagnoses (system-based):   1. Mediastinal Mass  On presentation to ED, CXR revealed   - CT shows "extensive mass-like mediastinal lymphadenopathy with the largest measuring up to 6.6 cm w/ additional LAD in the upper abdomen", and "ill-defined hypodensities in the liver suspicious for metastases" concerning for lymphoma.   - PET Scan (performed 10/6) shows diffuse mediastinal and B/L hilar lymphadenopathy. Increased FDG uptake w/ liver/spleen/osseous lesions suspicious for lymphoma. CT Surgery, IR and Heme/onc consulted.   - Testicular US revealed symmetrical homogenous testes without intratesticular mass   - Supraclavicular lymph node FNA + core biopsy performed 10/8/20; results pending  - Port placed 10/8  - Echo to be performed likely 10/8; results pending  - Do not suspect tuberculosis, as AFBx3 demonstrates no acid fast bacilli and QuantiFeron negative  - bHCG WNL, AFP WNL, Heb B ab reactive, Hep C virus non-reactive  - ESR 30.     2. Sepsis due to pneumonia: On admission, pt met 2/4 SIRS criteria (T 101.5F, ) possibly due to post-obstructive pneumonia.   - No risk factors for TB; negative AFB sputum cultures x3 and patient taken off isolation; negative QuantiFeron results  - In ED, blood cultures grew Aerococcus viridans, Staphylococcus epidermis, and coag negative Staphylococcus (possibly due to contamination)  - Pt placed on CTX 1000mg + Azithromycin 500mg    3. Acute Kidney Injury  - Improved with 2.4L NS that were administered in ED  - Monitor BMPs  - Cr 1.34 (10/8)    4. Anemia  - Pt was stable with no e/o bleeding  - Hb 10.4 (10/8)      Inpatient treatment course: Please review above  New medications: None  Labs to be followed outpatient: Glucose-6-phosphate dehydrogenase; ECHO   Exam to be followed outpatient: None    #DISCHARGE - DO NOT DELETE    Patient is 34 yo M with no known significant past medical history presented to [facility] ED on 10/4 with cough x 5 months. He reports episodic substernal chest discomfort. He reports being seen outpatient for this in the past and was told it was likely related to "indigestion", though he has not had any imaging. While at work tonight, he was coughing more than usual so his coworkers became concerned and told him to go to the ER to be evaluated. He states that aside from the cough and chest discomfort he does not have any other associated symptoms or acute medical complaints at this time. He noticed ~10lb weight loss since onset of symptoms. Denies fever, chills, night sweats, dizziness, sore throat, dysphagia, drooling, palpitations, hemoptysis, abd pain, N/V/D/C, hematochezia, melena, myalgias, arthralgias, recent travel, exposure to livestock/exotic animals, sick contacts.       Problem List/Main Diagnoses (system-based):   1. Mediastinal Mass  On presentation to ED, CXR revealed   - CT shows "extensive mass-like mediastinal lymphadenopathy with the largest measuring up to 6.6 cm w/ additional LAD in the upper abdomen", and "ill-defined hypodensities in the liver suspicious for metastases" concerning for lymphoma.   - PET Scan (performed 10/6) shows diffuse mediastinal and B/L hilar lymphadenopathy. Increased FDG uptake w/ liver/spleen/osseous lesions suspicious for lymphoma. CT Surgery, IR and Heme/onc consulted.   - Testicular US revealed symmetrical homogenous testes without intratesticular mass   - Supraclavicular lymph node FNA + core biopsy performed 10/8/20; results pending  - Port placed 10/8  - Echo to be performed likely 10/8; results pending  - Do not suspect tuberculosis, as AFBx3 demonstrates no acid fast bacilli and QuantiFeron negative  - bHCG WNL, AFP WNL, Heb B ab reactive, Hep C virus non-reactive  - ESR 30.     2. Sepsis due to pneumonia: On admission, pt met 2/4 SIRS criteria (T 101.5F, ) possibly due to post-obstructive pneumonia.   - No risk factors for TB; negative AFB sputum cultures x3 and patient taken off isolation; negative QuantiFeron results  - In ED, blood cultures grew Aerococcus viridans, Staphylococcus epidermis, and coag negative Staphylococcus (possibly due to contamination)  - Pt placed on CTX 1000mg + Azithromycin 500mg    3. Acute Kidney Injury  - Improved with 2.4L NS that were administered in ED  - Monitor BMPs  - Cr 1.34 (10/8)    4. Anemia  - Pt was stable with no e/o bleeding  - Hb 10.4 (10/8)      Inpatient treatment course: Please review above  New medications: None  Labs to be followed outpatient:   1.Glucose-6-phosphate dehydrogenase level  2.Echocardiogram results  3.supraclavicular lymph node biopsy pathology results  Exam to be followed outpatient: None    #DISCHARGE - DO NOT DELETE    Patient is 36 yo M with no known significant past medical history presented to [facility] ED on 10/4 with cough x 5 months. He reports episodic substernal chest discomfort. He reports being seen outpatient for this in the past and was told it was likely related to "indigestion", though he has not had any imaging. While at work tonight, he was coughing more than usual so his coworkers became concerned and told him to go to the ER to be evaluated. He states that aside from the cough and chest discomfort he does not have any other associated symptoms or acute medical complaints at this time. He noticed ~10lb weight loss since onset of symptoms. Denies fever, chills, night sweats, dizziness, sore throat, dysphagia, drooling, palpitations, hemoptysis, abd pain, N/V/D/C, hematochezia, melena, myalgias, arthralgias, recent travel, exposure to livestock/exotic animals, sick contacts.       Problem List/Main Diagnoses (system-based):   1. Mediastinal Mass  On presentation to ED, CXR revealed   - CT shows "extensive mass-like mediastinal lymphadenopathy with the largest measuring up to 6.6 cm w/ additional LAD in the upper abdomen", and "ill-defined hypodensities in the liver suspicious for metastases" concerning for lymphoma.   - PET Scan (performed 10/6) shows diffuse mediastinal and B/L hilar lymphadenopathy. Increased FDG uptake w/ liver/spleen/osseous lesions suspicious for lymphoma. CT Surgery, IR and Heme/onc consulted.   - Testicular US revealed symmetrical homogenous testes without intratesticular mass   - Supraclavicular lymph node FNA + core biopsy performed 10/8/20; results pending  - Port placed 10/8  - Echo to be performed likely 10/8; results pending  - Do not suspect tuberculosis, as AFBx3 demonstrates no acid fast bacilli and QuantiFeron negative  - bHCG WNL, AFP WNL, Heb B ab reactive, Hep C virus non-reactive  - ESR 30.     2. Sepsis due to pneumonia: On admission, pt met 2/4 SIRS criteria (T 101.5F, ) possibly due to post-obstructive pneumonia.   - No risk factors for TB; negative AFB sputum cultures x3 and patient taken off isolation; negative QuantiFeron results  - In ED, blood cultures grew Aerococcus viridans, Staphylococcus epidermis, and coag negative Staphylococcus (possibly due to contamination)  - Pt placed on CTX 1000mg + Azithromycin 500mg    3. Acute Kidney Injury  - Improved with 2.4L NS that were administered in ED  - Monitor BMPs  - Cr 1.34 (10/8)    4. Anemia  - Pt was stable with no e/o bleeding  - Hb 10.4 (10/8)      Inpatient treatment course: Please review above  New medications: Cefpodoxime 200mg q12hr (one tab today and one tab tomorrow)  Labs to be followed outpatient:   1.Glucose-6-phosphate dehydrogenase level  2.supraclavicular lymph node biopsy pathology results  Exam to be followed outpatient: None

## 2020-10-05 NOTE — H&P ADULT - PROBLEM SELECTOR PLAN 6
F: 110cc/hr NS  E: replete PRN  N: NPO for possible procedure  DVT ppx: holding for now in case pt undergoes procedure  Dispo: [facility]  CODE: Full F: 110cc/hr NS  E: replete PRN  N: NPO for possible procedure  DVT ppx: holding for now in case pt undergoes procedure, pls restart after  Dispo: [facility]  CODE: Full

## 2020-10-05 NOTE — DISCHARGE NOTE PROVIDER - NSDCCPCAREPLAN_GEN_ALL_CORE_FT
PRINCIPAL DISCHARGE DIAGNOSIS  Diagnosis: Mediastinal mass  Assessment and Plan of Treatment:        PRINCIPAL DISCHARGE DIAGNOSIS  Diagnosis: Mediastinal mass  Assessment and Plan of Treatment: On admission to [facility] you had a chest x-ray that revealed      SECONDARY DISCHARGE DIAGNOSES  Diagnosis: Pneumonia  Assessment and Plan of Treatment: On admission to [facility] you had a fever of     PRINCIPAL DISCHARGE DIAGNOSIS  Diagnosis: Mediastinal mass  Assessment and Plan of Treatment: You presented to [facility] on 10/4/2020 for a cough of 5 months duration associated with occasional night sweats, phlegm production. In the ED you had a fever of 101.5F and an elevated heart rate of 114; these are signs of an infective process. To help determine the cause of your symptoms you had multiple imaging studies and lab tests. On admission to [facility] you had a CT scan that revealed mediastinal lymphadenopathy, this means that there are lymph nodes in your chest that appeared to be of abnormal size and/or consistency. There were also enlarged nodules in your abdomen. A PET scan was performed the following day; this scan involved ingestion of a radioactive drink that highlights the metabolic activity of your body's cells. The PET scan highlighted the enlarged nodules in your abdomen - specifically in your liver, spleen and additional changes were noted to various bones in your body. Interventional radiology performed a supraclavicular node biopsy and inserted an infusaport under the skin on the right side of your chest. The infusaport will be used in the near future to deliver various medications into your bloodstream. Please follow up with Dr. De Jesus who will review the results of the biopsy and provide more information regarding your diagnosis.      SECONDARY DISCHARGE DIAGNOSES  Diagnosis: Pneumonia  Assessment and Plan of Treatment: The enlarged lymph nodes in your chest visualized on multiple imaging studies likely caused a post-obstructive pneumonia. This pneumonia is responsible for the fever of 101.5 and elevated heart rate that you had on admission to the hospital. You received antibiotics to treat pneumonia; an electronic prescription was submitted for the remaining 2 doses.        PRINCIPAL DISCHARGE DIAGNOSIS  Diagnosis: Mediastinal mass  Assessment and Plan of Treatment: You presented to [facility] on 10/4/2020 for a cough of 5 months duration associated with occasional night sweats, phlegm production. In the ED you had a fever of 101.5F and an elevated heart rate of 114; these are signs of an infective process. To help determine the cause of your symptoms you had multiple imaging studies and lab tests. On admission to [facility] you had a CT scan that revealed mediastinal lymphadenopathy, this means that there are lymph nodes in your chest that appeared to be of abnormal size and/or consistency. There were also enlarged nodules in your abdomen. A PET scan was performed the following day; this scan involved ingestion of a radioactive drink that highlights the metabolic activity of your body's cells. The PET scan highlighted the enlarged nodules in your abdomen - specifically in your liver, spleen and additional changes were noted to various bones in your body. Interventional radiology performed a supraclavicular node biopsy and inserted an infusaport under the skin on the right side of your chest. The infusaport will be used in the near future to deliver various medications into your bloodstream. You also had an echocardiogram performed to evaluate the blood flow throughout the chambers of your heart. Please follow up with Dr. De Jesus who will review the results of the biopsy and provide more information regarding your ongoing treatment.      SECONDARY DISCHARGE DIAGNOSES  Diagnosis: Pneumonia  Assessment and Plan of Treatment: The enlarged lymph nodes in your chest visualized on multiple imaging studies likely caused a post-obstructive pneumonia. This pneumonia is responsible for the fever of 101.5 and elevated heart rate that you had on admission to the hospital. You received intravenous antibiotics to treat pneumonia; an electronic prescription for the antibiotic Cefpodixime was submitted for the remaining 2 doses - please take one table today at 6PM and one tablet tomorrow morning.        PRINCIPAL DISCHARGE DIAGNOSIS  Diagnosis: Mediastinal mass  Assessment and Plan of Treatment: You presented to [facility] on 10/4/2020 for a cough of 5 months duration associated with occasional night sweats, phlegm production. In the ED you had a fever of 101.5F and an elevated heart rate of 114; these are signs of an infective process. To help determine the cause of your symptoms you had multiple imaging studies and lab tests. On admission to [facility] you had a CT scan that revealed mediastinal lymphadenopathy, this means that there are lymph nodes in your chest that appeared to be of abnormal size and/or consistency. There were also enlarged nodules in your abdomen. A PET scan was performed the following day; this scan involved ingestion of a radioactive drink that highlights the metabolic activity of your body's cells. The PET scan highlighted the enlarged nodules in your abdomen - specifically in your liver, spleen and additional changes were noted to various bones in your body. Interventional radiology performed a supraclavicular node biopsy and inserted an infusaport under the skin on the right side of your chest. The infusaport will be used in the near future to deliver various medications into your bloodstream. You also had an echocardiogram performed to evaluate the blood flow throughout the chambers of your heart. Please follow up with Dr. De Jesus who will review the results of the biopsy and provide more information regarding your ongoing treatment.      SECONDARY DISCHARGE DIAGNOSES  Diagnosis: Pneumonia  Assessment and Plan of Treatment: The enlarged lymph nodes in your chest visualized on multiple imaging studies likely caused a post-obstructive pneumonia. This pneumonia is responsible for the fever of 101.5 and elevated heart rate that you had on admission to the hospital. You received intravenous antibiotics to treat pneumonia; an electronic prescription for the antibiotic Cefpodixime was submitted for the remaining 2 doses - please take one tablet today at 6PM and one tablet tomorrow morning.

## 2020-10-05 NOTE — PROGRESS NOTE ADULT - PROBLEM SELECTOR PLAN 2
POA, d/t possible post-obstructive PNA; cont. CTX + azithromycin (day #1/5-7), supportive care; f/u cultures; no TB risk factors, but cont. airborne isolation pending 3 negative AFB sputum smears (may need to be induced)

## 2020-10-05 NOTE — PROGRESS NOTE ADULT - PROBLEM SELECTOR PLAN 1
CT shows "extensive mass-like mediastinal lymphadenopathy with the largest measuring up to 6.6 cm" w/ additional LAD in the upper abdomen, and "ill-defined hypodensities in the liver suspicious for metastases" concerning for lymphoma. CT Surgery and Heme/onc consulted.   - f/u testicular ultrasound   - f/u supraclavicular lymph node biopsy  - f/u PET scan   - f/u labs: ACE, bHCG, AFP, acetylcholine ab, Hep B/C, ESR

## 2020-10-06 DIAGNOSIS — R78.81 BACTEREMIA: ICD-10-CM

## 2020-10-06 LAB
ACE SERPL-CCNC: 82 U/L — SIGNIFICANT CHANGE UP (ref 14–82)
ALBUMIN SERPL ELPH-MCNC: 3.4 G/DL — SIGNIFICANT CHANGE UP (ref 3.3–5)
ALP SERPL-CCNC: 160 U/L — HIGH (ref 40–120)
ALT FLD-CCNC: 24 U/L — SIGNIFICANT CHANGE UP (ref 10–45)
ANION GAP SERPL CALC-SCNC: 11 MMOL/L — SIGNIFICANT CHANGE UP (ref 5–17)
AST SERPL-CCNC: 22 U/L — SIGNIFICANT CHANGE UP (ref 10–40)
BASOPHILS # BLD AUTO: 0.02 K/UL — SIGNIFICANT CHANGE UP (ref 0–0.2)
BASOPHILS NFR BLD AUTO: 0.6 % — SIGNIFICANT CHANGE UP (ref 0–2)
BILIRUB SERPL-MCNC: 0.2 MG/DL — SIGNIFICANT CHANGE UP (ref 0.2–1.2)
BUN SERPL-MCNC: 11 MG/DL — SIGNIFICANT CHANGE UP (ref 7–23)
CALCIUM SERPL-MCNC: 10 MG/DL — SIGNIFICANT CHANGE UP (ref 8.4–10.5)
CHLORIDE SERPL-SCNC: 104 MMOL/L — SIGNIFICANT CHANGE UP (ref 96–108)
CO2 SERPL-SCNC: 24 MMOL/L — SIGNIFICANT CHANGE UP (ref 22–31)
CREAT SERPL-MCNC: 1.46 MG/DL — HIGH (ref 0.5–1.3)
CRP SERPL-MCNC: 8.52 MG/DL — HIGH (ref 0–0.4)
EOSINOPHIL # BLD AUTO: 0.12 K/UL — SIGNIFICANT CHANGE UP (ref 0–0.5)
EOSINOPHIL NFR BLD AUTO: 3.9 % — SIGNIFICANT CHANGE UP (ref 0–6)
GLUCOSE SERPL-MCNC: 103 MG/DL — HIGH (ref 70–99)
HCT VFR BLD CALC: 31.2 % — LOW (ref 39–50)
HGB BLD-MCNC: 9.8 G/DL — LOW (ref 13–17)
IMM GRANULOCYTES NFR BLD AUTO: 2.9 % — HIGH (ref 0–1.5)
LYMPHOCYTES # BLD AUTO: 0.44 K/UL — LOW (ref 1–3.3)
LYMPHOCYTES # BLD AUTO: 14.2 % — SIGNIFICANT CHANGE UP (ref 13–44)
MAGNESIUM SERPL-MCNC: 2 MG/DL — SIGNIFICANT CHANGE UP (ref 1.6–2.6)
MCHC RBC-ENTMCNC: 26.9 PG — LOW (ref 27–34)
MCHC RBC-ENTMCNC: 31.4 GM/DL — LOW (ref 32–36)
MCV RBC AUTO: 85.7 FL — SIGNIFICANT CHANGE UP (ref 80–100)
MONOCYTES # BLD AUTO: 0.61 K/UL — SIGNIFICANT CHANGE UP (ref 0–0.9)
MONOCYTES NFR BLD AUTO: 19.7 % — HIGH (ref 2–14)
NEUTROPHILS # BLD AUTO: 1.82 K/UL — SIGNIFICANT CHANGE UP (ref 1.8–7.4)
NEUTROPHILS NFR BLD AUTO: 58.7 % — SIGNIFICANT CHANGE UP (ref 43–77)
NIGHT BLUE STAIN TISS: SIGNIFICANT CHANGE UP
NIGHT BLUE STAIN TISS: SIGNIFICANT CHANGE UP
NRBC # BLD: 0 /100 WBCS — SIGNIFICANT CHANGE UP (ref 0–0)
PHOSPHATE SERPL-MCNC: 3.4 MG/DL — SIGNIFICANT CHANGE UP (ref 2.5–4.5)
PLATELET # BLD AUTO: 218 K/UL — SIGNIFICANT CHANGE UP (ref 150–400)
POTASSIUM SERPL-MCNC: 4.5 MMOL/L — SIGNIFICANT CHANGE UP (ref 3.5–5.3)
POTASSIUM SERPL-SCNC: 4.5 MMOL/L — SIGNIFICANT CHANGE UP (ref 3.5–5.3)
PROT SERPL-MCNC: 6.7 G/DL — SIGNIFICANT CHANGE UP (ref 6–8.3)
RBC # BLD: 3.64 M/UL — LOW (ref 4.2–5.8)
RBC # FLD: 16.3 % — HIGH (ref 10.3–14.5)
SODIUM SERPL-SCNC: 139 MMOL/L — SIGNIFICANT CHANGE UP (ref 135–145)
SPECIMEN SOURCE: SIGNIFICANT CHANGE UP
SPECIMEN SOURCE: SIGNIFICANT CHANGE UP
WBC # BLD: 3.1 K/UL — LOW (ref 3.8–10.5)
WBC # FLD AUTO: 3.1 K/UL — LOW (ref 3.8–10.5)

## 2020-10-06 PROCEDURE — 99233 SBSQ HOSP IP/OBS HIGH 50: CPT | Mod: GC

## 2020-10-06 RX ORDER — ENOXAPARIN SODIUM 100 MG/ML
40 INJECTION SUBCUTANEOUS ONCE
Refills: 0 | Status: COMPLETED | OUTPATIENT
Start: 2020-10-06 | End: 2020-10-06

## 2020-10-06 RX ADMIN — SODIUM CHLORIDE 110 MILLILITER(S): 9 INJECTION INTRAMUSCULAR; INTRAVENOUS; SUBCUTANEOUS at 02:15

## 2020-10-06 RX ADMIN — CEFTRIAXONE 100 MILLIGRAM(S): 500 INJECTION, POWDER, FOR SOLUTION INTRAMUSCULAR; INTRAVENOUS at 17:34

## 2020-10-06 RX ADMIN — SODIUM CHLORIDE 3 MILLILITER(S): 9 INJECTION INTRAMUSCULAR; INTRAVENOUS; SUBCUTANEOUS at 11:38

## 2020-10-06 RX ADMIN — ENOXAPARIN SODIUM 40 MILLIGRAM(S): 100 INJECTION SUBCUTANEOUS at 11:59

## 2020-10-06 RX ADMIN — Medication 650 MILLIGRAM(S): at 01:00

## 2020-10-06 RX ADMIN — SODIUM CHLORIDE 110 MILLILITER(S): 9 INJECTION INTRAMUSCULAR; INTRAVENOUS; SUBCUTANEOUS at 11:38

## 2020-10-06 RX ADMIN — Medication 650 MILLIGRAM(S): at 00:01

## 2020-10-06 RX ADMIN — SODIUM CHLORIDE 3 MILLILITER(S): 9 INJECTION INTRAMUSCULAR; INTRAVENOUS; SUBCUTANEOUS at 00:40

## 2020-10-06 RX ADMIN — AZITHROMYCIN 500 MILLIGRAM(S): 500 TABLET, FILM COATED ORAL at 07:06

## 2020-10-06 RX ADMIN — SODIUM CHLORIDE 110 MILLILITER(S): 9 INJECTION INTRAMUSCULAR; INTRAVENOUS; SUBCUTANEOUS at 21:53

## 2020-10-06 RX ADMIN — SODIUM CHLORIDE 3 MILLILITER(S): 9 INJECTION INTRAMUSCULAR; INTRAVENOUS; SUBCUTANEOUS at 06:54

## 2020-10-06 RX ADMIN — SODIUM CHLORIDE 3 MILLILITER(S): 9 INJECTION INTRAMUSCULAR; INTRAVENOUS; SUBCUTANEOUS at 22:42

## 2020-10-06 NOTE — PROGRESS NOTE ADULT - PROBLEM SELECTOR PLAN 1
CT shows "extensive mass-like mediastinal lymphadenopathy with the largest measuring up to 6.6 cm" w/ additional LAD in the upper abdomen, and "ill-defined hypodensities in the liver suspicious for metastases" concerning for malignancy (lung primary vs. lymphoma); appreciate CTS and Heme-Onc recs; plan for IR bx; f/u PET-CT results

## 2020-10-06 NOTE — PROGRESS NOTE ADULT - PROBLEM SELECTOR PLAN 2
On admission pt met 2/4 SIRS criteria (T 101,5F, ) possibly due to post-obstructive PNA  - no risk factors for TB; f/u quantiferon  - continue CTX 1000mg + Azithromycin 500mg  - f/u surveillance blood cx, sputum cx  ED blood cx grew coag neg staph - likely contaminate; surveillance cultures obtained   - f/u quantiferon  - cont. airborne isolation pending 3 negative AFB sputum smears (pt requires sputum induction - sodium chloride 3% inhalation)

## 2020-10-06 NOTE — CONSULT NOTE ADULT - SUBJECTIVE AND OBJECTIVE BOX
GHADA Elam  MRN-[id number]    HPI: 35 y.o man with no PMH presented to the ED c/o an intermittently dry-to-productive cough (minimal white sputum) for the past 5 months. This was accompanied by intermittent chest discomfort. Also reports drenching night sweats and 10 Lb weight loss. He developed lw grade fever a few days before the admission. He presented to the ER. In the ED patient was found to be febrile and tachycardic, COVID swab negative. CXR  demonstrated mediastinal mass and subsequently underwent CTA which revealed 6cm mediastinal mass with extensive mediastinal lymphadenopathy concerning for lymphoma  Labs demosntrated moderate anemia and lymphopenia.   AFP, BHCG and LDH all WNL.     ROS:  + chest pain and  SOB  + cough  + fever  + night sweats  No nausea/vomiting  + fatigue,  10 Lbs weight loss.\  No leg pain or leg swelling.    ROS is otherwise negative.    PMH/PSH:  PAST MEDICAL & SURGICAL HISTORY:  No pertinent past medical history    History of tonsillectomy    Medications:  MEDICATIONS  (STANDING):  azithromycin   Tablet 500 milliGRAM(s) Oral every 24 hours  cefTRIAXone   IVPB 1000 milliGRAM(s) IV Intermittent every 24 hours  sodium chloride 0.9%. 1000 milliLiter(s) (110 mL/Hr) IV Continuous <Continuous>  sodium chloride 3%  Inhalation 3 milliLiter(s) Inhalation every 6 hours    MEDICATIONS  (PRN):  acetaminophen   Tablet .. 650 milliGRAM(s) Oral every 6 hours PRN Temp greater or equal to 38C (100.4F), Mild Pain (1 - 3), Moderate Pain (4 - 6)    Allergies    No Known Allergies    Intolerances    Exam:  Vital Signs Last 24 Hrs  T(C): 36.6 (10-06-20 @ 06:42), Max: 37.8 (10-05-20 @ 10:32)  T(F): 97.9 (10-06-20 @ 06:42), Max: 100.1 (10-05-20 @ 10:32)  HR: 71 (10-06-20 @ 06:42) (71 - 97)  BP: 115/80 (10-06-20 @ 06:42) (115/80 - 133/86)  BP(mean): --  RR: 16 (10-06-20 @ 06:42) (16 - 18)  SpO2: 97% (10-06-20 @ 06:42) (95% - 97%)  HEENT: pink mucosae, anicteric sclerae  No palpable peripheral lymphadenopathy  COR: regular rhytm, rate, no murmurs, rubs or gallops  PULMO: clear to auscultation B/L  ABD: soft, no palpable masses, no splenomegaly  EXT: no edema  NEURO: intact  SKIN: no lesions on visible skin    Labs:  CBC Full  -  ( 05 Oct 2020 06:29 )  WBC Count : 3.54 K/uL  RBC Count : 3.47 M/uL  Hemoglobin : 9.2 g/dL  Hematocrit : 28.8 %  Platelet Count - Automated : 205 K/uL  Mean Cell Volume : 83.0 fl  Mean Cell Hemoglobin : 26.5 pg  Mean Cell Hemoglobin Concentration : 31.9 gm/dL  Auto Neutrophil # : 2.08 K/uL  Auto Lymphocyte # : 0.37 K/uL  Auto Monocyte # : 0.86 K/uL  Auto Eosinophil # : 0.13 K/uL  Auto Basophil # : 0.02 K/uL  Auto Neutrophil % : 58.6 %  Auto Lymphocyte % : 10.5 %  Auto Monocyte % : 24.3 %  Auto Eosinophil % : 3.7 %  Auto Basophil % : 0.6 %    PT/INR - ( 05 Oct 2020 10:44 )   PT: 14.1 sec;   INR: 1.18          PTT - ( 05 Oct 2020 10:44 )  PTT:32.6 sec    10-05    136  |  102  |  15  ----------------------------<  108<H>  3.8   |  23  |  1.56<H>    Ca    9.2      05 Oct 2020 06:29  Phos  3.3     10-05  Mg     1.8     10-05    TPro  6.6  /  Alb  3.4  /  TBili  0.2  /  DBili  x   /  AST  19  /  ALT  21  /  AlkPhos  147<H>  10-05      Pertinent imaging studies: reviewed    Assessment:    Mediastinal mass  Anemia/lymphopenia    Plan:    Clinically stable  Work up in progress  AFP/BHCG/LDH all normal  TB being ruled out  Core biopsy by IR when TB is ruled out  PET/CT    Further recommendations to follow     Thank you    Denzel De Jesus MD  [phone]

## 2020-10-06 NOTE — PROGRESS NOTE ADULT - PROBLEM SELECTOR PLAN 1
CT shows "extensive mass-like mediastinal lymphadenopathy with the largest measuring up to 6.6 cm" w/ additional LAD in the upper abdomen, and "ill-defined hypodensities in the liver suspicious for metastases" concerning for lymphoma. CT Surgery and Heme/onc consulted.   - Testicular US revealed symmetrical homogenous testes without intratesticular mass   - supraclavicular lymph node FNA + core biopsy pending TB negative result as per IR   - f/u PET scan final read  - f/u labs: ACE, acetylcholine ab  - bHCG WNL, AFP WNL, Heb B ab reactive   - ESR 30 CT shows "extensive mass-like mediastinal lymphadenopathy with the largest measuring up to 6.6 cm" w/ additional LAD in the upper abdomen, and "ill-defined hypodensities in the liver suspicious for metastases" concerning for lymphoma. CT Surgery and Heme/onc consulted.   - Testicular US revealed symmetrical homogenous testes without intratesticular mass   - supraclavicular lymph node FNA + core biopsy pending TB negative result as per IR   - f/u PET scan final read  - f/u labs: ACE, acetylcholine ab  - bHCG WNL, AFP WNL, Heb B ab reactive, Hep C virus non-reactive  - ESR 30

## 2020-10-06 NOTE — PROGRESS NOTE ADULT - PROBLEM SELECTOR PLAN 2
POA, d/t possible post-obstructive PNA; cont. CTX + azithromycin (day #2/5-7), supportive care; f/u cultures; no TB risk factors, but cont. airborne isolation pending 3 negative AFB sputum smears (1 negative so far)

## 2020-10-07 LAB
ALBUMIN SERPL ELPH-MCNC: 3 G/DL — LOW (ref 3.3–5)
ALP SERPL-CCNC: 156 U/L — HIGH (ref 40–120)
ALT FLD-CCNC: 21 U/L — SIGNIFICANT CHANGE UP (ref 10–45)
ANION GAP SERPL CALC-SCNC: 9 MMOL/L — SIGNIFICANT CHANGE UP (ref 5–17)
AST SERPL-CCNC: 21 U/L — SIGNIFICANT CHANGE UP (ref 10–40)
BASOPHILS # BLD AUTO: 0.01 K/UL — SIGNIFICANT CHANGE UP (ref 0–0.2)
BASOPHILS NFR BLD AUTO: 0.3 % — SIGNIFICANT CHANGE UP (ref 0–2)
BILIRUB SERPL-MCNC: 0.2 MG/DL — SIGNIFICANT CHANGE UP (ref 0.2–1.2)
BUN SERPL-MCNC: 11 MG/DL — SIGNIFICANT CHANGE UP (ref 7–23)
CALCIUM SERPL-MCNC: 9.6 MG/DL — SIGNIFICANT CHANGE UP (ref 8.4–10.5)
CHLORIDE SERPL-SCNC: 106 MMOL/L — SIGNIFICANT CHANGE UP (ref 96–108)
CO2 SERPL-SCNC: 23 MMOL/L — SIGNIFICANT CHANGE UP (ref 22–31)
CREAT SERPL-MCNC: 1.37 MG/DL — HIGH (ref 0.5–1.3)
CULTURE RESULTS: SIGNIFICANT CHANGE UP
EOSINOPHIL # BLD AUTO: 0.14 K/UL — SIGNIFICANT CHANGE UP (ref 0–0.5)
EOSINOPHIL NFR BLD AUTO: 4.5 % — SIGNIFICANT CHANGE UP (ref 0–6)
GAMMA INTERFERON BACKGROUND BLD IA-ACNC: 0.27 IU/ML — SIGNIFICANT CHANGE UP
GLUCOSE SERPL-MCNC: 106 MG/DL — HIGH (ref 70–99)
HCT VFR BLD CALC: 30 % — LOW (ref 39–50)
HGB BLD-MCNC: 9.4 G/DL — LOW (ref 13–17)
IMM GRANULOCYTES NFR BLD AUTO: 2.9 % — HIGH (ref 0–1.5)
LYMPHOCYTES # BLD AUTO: 0.5 K/UL — LOW (ref 1–3.3)
LYMPHOCYTES # BLD AUTO: 16 % — SIGNIFICANT CHANGE UP (ref 13–44)
M TB IFN-G BLD-IMP: ABNORMAL
M TB IFN-G CD4+ BCKGRND COR BLD-ACNC: 0.02 IU/ML — SIGNIFICANT CHANGE UP
M TB IFN-G CD4+CD8+ BCKGRND COR BLD-ACNC: 0.01 IU/ML — SIGNIFICANT CHANGE UP
MAGNESIUM SERPL-MCNC: 1.8 MG/DL — SIGNIFICANT CHANGE UP (ref 1.6–2.6)
MCHC RBC-ENTMCNC: 26.6 PG — LOW (ref 27–34)
MCHC RBC-ENTMCNC: 31.3 GM/DL — LOW (ref 32–36)
MCV RBC AUTO: 84.7 FL — SIGNIFICANT CHANGE UP (ref 80–100)
MONOCYTES # BLD AUTO: 0.63 K/UL — SIGNIFICANT CHANGE UP (ref 0–0.9)
MONOCYTES NFR BLD AUTO: 20.1 % — HIGH (ref 2–14)
NEUTROPHILS # BLD AUTO: 1.76 K/UL — LOW (ref 1.8–7.4)
NEUTROPHILS NFR BLD AUTO: 56.2 % — SIGNIFICANT CHANGE UP (ref 43–77)
NIGHT BLUE STAIN TISS: SIGNIFICANT CHANGE UP
NIGHT BLUE STAIN TISS: SIGNIFICANT CHANGE UP
NRBC # BLD: 0 /100 WBCS — SIGNIFICANT CHANGE UP (ref 0–0)
ORGANISM # SPEC MICROSCOPIC CNT: SIGNIFICANT CHANGE UP
ORGANISM # SPEC MICROSCOPIC CNT: SIGNIFICANT CHANGE UP
PHOSPHATE SERPL-MCNC: 3.7 MG/DL — SIGNIFICANT CHANGE UP (ref 2.5–4.5)
PLATELET # BLD AUTO: 214 K/UL — SIGNIFICANT CHANGE UP (ref 150–400)
POTASSIUM SERPL-MCNC: 4.3 MMOL/L — SIGNIFICANT CHANGE UP (ref 3.5–5.3)
POTASSIUM SERPL-SCNC: 4.3 MMOL/L — SIGNIFICANT CHANGE UP (ref 3.5–5.3)
PROT SERPL-MCNC: 6.3 G/DL — SIGNIFICANT CHANGE UP (ref 6–8.3)
QUANT TB PLUS MITOGEN MINUS NIL: 0.37 IU/ML — SIGNIFICANT CHANGE UP
RBC # BLD: 3.54 M/UL — LOW (ref 4.2–5.8)
RBC # FLD: 16.1 % — HIGH (ref 10.3–14.5)
SODIUM SERPL-SCNC: 138 MMOL/L — SIGNIFICANT CHANGE UP (ref 135–145)
SPECIMEN SOURCE: SIGNIFICANT CHANGE UP
WBC # BLD: 3.13 K/UL — LOW (ref 3.8–10.5)
WBC # FLD AUTO: 3.13 K/UL — LOW (ref 3.8–10.5)

## 2020-10-07 PROCEDURE — 99233 SBSQ HOSP IP/OBS HIGH 50: CPT | Mod: GC

## 2020-10-07 RX ADMIN — SODIUM CHLORIDE 110 MILLILITER(S): 9 INJECTION INTRAMUSCULAR; INTRAVENOUS; SUBCUTANEOUS at 04:01

## 2020-10-07 RX ADMIN — AZITHROMYCIN 500 MILLIGRAM(S): 500 TABLET, FILM COATED ORAL at 08:18

## 2020-10-07 RX ADMIN — CEFTRIAXONE 100 MILLIGRAM(S): 500 INJECTION, POWDER, FOR SOLUTION INTRAMUSCULAR; INTRAVENOUS at 17:07

## 2020-10-07 NOTE — PROGRESS NOTE ADULT - PROBLEM SELECTOR PLAN 4
Hb stable, no e/o bleeding, monitor CBC
Hb stable, no e/o bleeding, monitor CBC
Hb stable, no e/o bleeding, monitor CBC  - Hb 9.8 (10/6 AM)
Hb stable, no e/o bleeding, monitor CBC  - Hb 9.2 (10/5 AM)
Hb stable, no e/o bleeding, monitor CBC  - Hb 9.8 (10/6 AM)

## 2020-10-07 NOTE — PROGRESS NOTE ADULT - PROBLEM SELECTOR PLAN 2
On admission pt met 2/4 SIRS criteria (T 101,5F, ) possibly due to post-obstructive PNA  - no risk factors for TB; quantiferon negative   - continue CTX 1000mg + Azithromycin 500mg  - f/u surveillance blood cx, sputum cx  ED blood cx grew coag neg staph - likely contaminate; surveillance cultures obtained On admission pt met 2/4 SIRS criteria (T 101,5F, ) possibly due to post-obstructive PNA  - no risk factors for TB; quantiferon negative   - continue CTX 1000mg + Azithromycin 500mg  - surveillance blood cx NGTD

## 2020-10-07 NOTE — PROGRESS NOTE ADULT - ATTENDING COMMENTS
Dispo: home pending further work-up, 3 negative AFB sputum smears
Dispo: home pending further work-up, 3 negative AFB sputum smears
Pt. seen and examined by me earlier today; I have read Dr. Hernandez's note, I agree w/ her findings and plan of care as documented

## 2020-10-07 NOTE — PROGRESS NOTE ADULT - PROBLEM SELECTOR PLAN 1
CT shows "extensive mass-like mediastinal lymphadenopathy with the largest measuring up to 6.6 cm" w/ additional LAD in the upper abdomen, and "ill-defined hypodensities in the liver suspicious for metastases" concerning for lymphoma. PET Scan (performed 10/6) shows diffuse mediastinal and B/L hilar lymphadenopathy. Increased FDG uptake w/ liver/spleen/osseous lesions suspicious for lymphoma. CT Surgery, IR and Heme/onc consulted.   - Testicular US revealed symmetrical homogenous testes without intratesticular mass   - supraclavicular lymph node FNA + core biopsy to be performed by IR pending 3 neg AFB and removal of airborne precautions  - do not suspect tuberculosis, as AFBx3 demonstrates no acid fast bacilli   - bHCG WNL, AFP WNL, Heb B ab reactive, Hep C virus non-reactive  - ESR 30 CT shows "extensive mass-like mediastinal lymphadenopathy with the largest measuring up to 6.6 cm" w/ additional LAD in the upper abdomen, and "ill-defined hypodensities in the liver suspicious for metastases" concerning for lymphoma. PET Scan (performed 10/6) shows diffuse mediastinal and B/L hilar lymphadenopathy. Increased FDG uptake w/ liver/spleen/osseous lesions suspicious for lymphoma. CT Surgery, IR and Heme/onc consulted.   - Testicular US revealed symmetrical homogenous testes without intratesticular mass   - supraclavicular lymph node FNA + core biopsy to be performed by IR pending 3 neg AFB and removal of airborne precautions; likely 10/7/2020  - do not suspect tuberculosis, as AFBx3 demonstrates no acid fast bacilli   - bHCG WNL, AFP WNL, Heb B ab reactive, Hep C virus non-reactive  - ESR 30

## 2020-10-07 NOTE — PROGRESS NOTE ADULT - PROBLEM SELECTOR PLAN 3
Improving. 2.4L NS administered in ED.   - monitor BMPs  - Cr 1.46 (10/6 AM)
resolving w/ fluids; monitor BMP
resolving w/ fluids; monitor BMP
Improving. 2.4L NS administered in ED.   - monitor BMPs  - Cr 1.46 (10/6 AM)
Improving. 2.4L NS administered in ED.   - monitor BMPs  - Cr 1.56 (10/5 AM)

## 2020-10-07 NOTE — PROGRESS NOTE ADULT - PROBLEM SELECTOR PROBLEM 2
Sepsis due to pneumonia

## 2020-10-07 NOTE — PROGRESS NOTE ADULT - ASSESSMENT
34 y/o M w/
35 year old male, former smoker (>15 yr hx; quit 3yrs ago, recently restarted occasional 1-2 cigarettes daily), with no significant PMHx presented to [facility] ED yesterday 10/4 evening complaining of non-productive cough x 5 months. In the ED patient was found to be febrile and tachycardic, COVID swab negative, labs significant for leukopenia and anemia. Patient has CXR which demonstrated mediastinal mass and subsequently underwent CTA which revealed 6cm mediastinal mass with extensive mediastinal lymphadenopathy concerning for lymphoma. He was admitted to medicine for further evaluation. Heme/onc (Dr. De Jesus) following.      
36 y/o M w/
35 year old male, former smoker (>15 yr hx; quit 3yrs ago, recently restarted occasional 1-2 cigarettes daily), with no significant PMHx presented to [facility] ED yesterday 10/4 evening complaining of non-productive cough x 5 months. In the ED patient was found to be febrile and tachycardic, COVID swab negative, labs significant for leukopenia and anemia. Patient has CXR which demonstrated mediastinal mass and subsequently underwent CTA which revealed 6cm mediastinal mass with extensive mediastinal lymphadenopathy concerning for lymphoma. He was admitted to medicine for further evaluation. Heme/onc (Dr. De Jesus) and CT surgery consulted.      
35 year old male, former smoker (>15 yr hx; quit 3yrs ago, recently restarted occasional 1-2 cigarettes daily), with no significant PMHx presented to [facility] ED yesterday 10/4 evening complaining of non-productive cough x 5 months. In the ED patient was found to be febrile and tachycardic, COVID swab negative, labs significant for leukopenia and anemia. Patient has CXR which demonstrated mediastinal mass and subsequently underwent CTA which revealed 6cm mediastinal mass with extensive mediastinal lymphadenopathy concerning for lymphoma. PET scan demonstrates liver/spleen/bone lesions increasing likelihood of lymphoma. He was admitted to medicine for further evaluation. Heme/onc (Dr. De Jesus), CT surgery and IR consulted.

## 2020-10-07 NOTE — PROGRESS NOTE ADULT - PROBLEM SELECTOR PROBLEM 3
STEFANY (acute kidney injury)

## 2020-10-08 ENCOUNTER — RESULT REVIEW (OUTPATIENT)
Age: 35
End: 2020-10-08

## 2020-10-08 ENCOUNTER — TRANSCRIPTION ENCOUNTER (OUTPATIENT)
Age: 35
End: 2020-10-08

## 2020-10-08 VITALS — WEIGHT: 169.98 LBS

## 2020-10-08 LAB
ACRM MODULATING ANTIBODY: 0 NMOL/L — SIGNIFICANT CHANGE UP
ALBUMIN SERPL ELPH-MCNC: 3.1 G/DL — LOW (ref 3.3–5)
ALP SERPL-CCNC: 165 U/L — HIGH (ref 40–120)
ALT FLD-CCNC: 24 U/L — SIGNIFICANT CHANGE UP (ref 10–45)
ANION GAP SERPL CALC-SCNC: 10 MMOL/L — SIGNIFICANT CHANGE UP (ref 5–17)
ANISOCYTOSIS BLD QL: SLIGHT — SIGNIFICANT CHANGE UP
APTT BLD: 35.2 SEC — SIGNIFICANT CHANGE UP (ref 27.5–35.5)
AST SERPL-CCNC: 24 U/L — SIGNIFICANT CHANGE UP (ref 10–40)
BASOPHILS # BLD AUTO: 0.02 K/UL — SIGNIFICANT CHANGE UP (ref 0–0.2)
BASOPHILS NFR BLD AUTO: 0.9 % — SIGNIFICANT CHANGE UP (ref 0–2)
BILIRUB SERPL-MCNC: 0.2 MG/DL — SIGNIFICANT CHANGE UP (ref 0.2–1.2)
BUN SERPL-MCNC: 10 MG/DL — SIGNIFICANT CHANGE UP (ref 7–23)
CALCIUM SERPL-MCNC: 9.6 MG/DL — SIGNIFICANT CHANGE UP (ref 8.4–10.5)
CHLORIDE SERPL-SCNC: 105 MMOL/L — SIGNIFICANT CHANGE UP (ref 96–108)
CO2 SERPL-SCNC: 25 MMOL/L — SIGNIFICANT CHANGE UP (ref 22–31)
CREAT SERPL-MCNC: 1.34 MG/DL — HIGH (ref 0.5–1.3)
EOSINOPHIL # BLD AUTO: 0.16 K/UL — SIGNIFICANT CHANGE UP (ref 0–0.5)
EOSINOPHIL NFR BLD AUTO: 6.2 % — HIGH (ref 0–6)
GLUCOSE SERPL-MCNC: 103 MG/DL — HIGH (ref 70–99)
HCT VFR BLD CALC: 33.1 % — LOW (ref 39–50)
HGB BLD-MCNC: 10.4 G/DL — LOW (ref 13–17)
HYPOCHROMIA BLD QL: SLIGHT — SIGNIFICANT CHANGE UP
INR BLD: 1.08 — SIGNIFICANT CHANGE UP (ref 0.88–1.16)
LYMPHOCYTES # BLD AUTO: 0.42 K/UL — LOW (ref 1–3.3)
LYMPHOCYTES # BLD AUTO: 16.8 % — SIGNIFICANT CHANGE UP (ref 13–44)
MAGNESIUM SERPL-MCNC: 1.8 MG/DL — SIGNIFICANT CHANGE UP (ref 1.6–2.6)
MANUAL SMEAR VERIFICATION: SIGNIFICANT CHANGE UP
MCHC RBC-ENTMCNC: 26.9 PG — LOW (ref 27–34)
MCHC RBC-ENTMCNC: 31.4 GM/DL — LOW (ref 32–36)
MCV RBC AUTO: 85.8 FL — SIGNIFICANT CHANGE UP (ref 80–100)
METAMYELOCYTES # FLD: 2.7 % — HIGH (ref 0–0)
MICROCYTES BLD QL: SLIGHT — SIGNIFICANT CHANGE UP
MONOCYTES # BLD AUTO: 0.27 K/UL — SIGNIFICANT CHANGE UP (ref 0–0.9)
MONOCYTES NFR BLD AUTO: 10.6 % — SIGNIFICANT CHANGE UP (ref 2–14)
MYELOCYTES NFR BLD: 5.3 % — HIGH (ref 0–0)
NEUTROPHILS # BLD AUTO: 1.44 K/UL — LOW (ref 1.8–7.4)
NEUTROPHILS NFR BLD AUTO: 57.5 % — SIGNIFICANT CHANGE UP (ref 43–77)
OVALOCYTES BLD QL SMEAR: SLIGHT — SIGNIFICANT CHANGE UP
PHOSPHATE SERPL-MCNC: 3.5 MG/DL — SIGNIFICANT CHANGE UP (ref 2.5–4.5)
PLAT MORPH BLD: NORMAL — SIGNIFICANT CHANGE UP
PLATELET # BLD AUTO: 260 K/UL — SIGNIFICANT CHANGE UP (ref 150–400)
POTASSIUM SERPL-MCNC: 4 MMOL/L — SIGNIFICANT CHANGE UP (ref 3.5–5.3)
POTASSIUM SERPL-SCNC: 4 MMOL/L — SIGNIFICANT CHANGE UP (ref 3.5–5.3)
PROT SERPL-MCNC: 6.6 G/DL — SIGNIFICANT CHANGE UP (ref 6–8.3)
PROTHROM AB SERPL-ACNC: 12.9 SEC — SIGNIFICANT CHANGE UP (ref 10.6–13.6)
RBC # BLD: 3.86 M/UL — LOW (ref 4.2–5.8)
RBC # FLD: 16.4 % — HIGH (ref 10.3–14.5)
RBC BLD AUTO: ABNORMAL
SODIUM SERPL-SCNC: 140 MMOL/L — SIGNIFICANT CHANGE UP (ref 135–145)
WBC # BLD: 2.51 K/UL — LOW (ref 3.8–10.5)
WBC # FLD AUTO: 2.51 K/UL — LOW (ref 3.8–10.5)

## 2020-10-08 PROCEDURE — 83615 LACTATE (LD) (LDH) ENZYME: CPT

## 2020-10-08 PROCEDURE — 86803 HEPATITIS C AB TEST: CPT

## 2020-10-08 PROCEDURE — 86706 HEP B SURFACE ANTIBODY: CPT

## 2020-10-08 PROCEDURE — 93306 TTE W/DOPPLER COMPLETE: CPT | Mod: 26

## 2020-10-08 PROCEDURE — 87635 SARS-COV-2 COVID-19 AMP PRB: CPT

## 2020-10-08 PROCEDURE — 71260 CT THORAX DX C+: CPT

## 2020-10-08 PROCEDURE — 99152 MOD SED SAME PHYS/QHP 5/>YRS: CPT

## 2020-10-08 PROCEDURE — 38505 NEEDLE BIOPSY LYMPH NODES: CPT | Mod: RT

## 2020-10-08 PROCEDURE — 86703 HIV-1/HIV-2 1 RESULT ANTBDY: CPT

## 2020-10-08 PROCEDURE — 82728 ASSAY OF FERRITIN: CPT

## 2020-10-08 PROCEDURE — 86850 RBC ANTIBODY SCREEN: CPT

## 2020-10-08 PROCEDURE — A9552: CPT

## 2020-10-08 PROCEDURE — 87340 HEPATITIS B SURFACE AG IA: CPT

## 2020-10-08 PROCEDURE — 84443 ASSAY THYROID STIM HORMONE: CPT

## 2020-10-08 PROCEDURE — 84100 ASSAY OF PHOSPHORUS: CPT

## 2020-10-08 PROCEDURE — 88342 IMHCHEM/IMCYTCHM 1ST ANTB: CPT | Mod: 26

## 2020-10-08 PROCEDURE — 93005 ELECTROCARDIOGRAM TRACING: CPT

## 2020-10-08 PROCEDURE — 84704 HCG FREE BETACHAIN TEST: CPT

## 2020-10-08 PROCEDURE — 87040 BLOOD CULTURE FOR BACTERIA: CPT

## 2020-10-08 PROCEDURE — 96374 THER/PROPH/DIAG INJ IV PUSH: CPT | Mod: XU

## 2020-10-08 PROCEDURE — 82962 GLUCOSE BLOOD TEST: CPT

## 2020-10-08 PROCEDURE — 85025 COMPLETE CBC W/AUTO DIFF WBC: CPT

## 2020-10-08 PROCEDURE — 83735 ASSAY OF MAGNESIUM: CPT

## 2020-10-08 PROCEDURE — C1788: CPT

## 2020-10-08 PROCEDURE — 87116 MYCOBACTERIA CULTURE: CPT

## 2020-10-08 PROCEDURE — 86041 ACETYLCHOLN RCPTR BNDNG ANTB: CPT

## 2020-10-08 PROCEDURE — 88305 TISSUE EXAM BY PATHOLOGIST: CPT | Mod: 26

## 2020-10-08 PROCEDURE — 76942 ECHO GUIDE FOR BIOPSY: CPT

## 2020-10-08 PROCEDURE — 99239 HOSP IP/OBS DSCHRG MGMT >30: CPT | Mod: GC

## 2020-10-08 PROCEDURE — 87150 DNA/RNA AMPLIFIED PROBE: CPT

## 2020-10-08 PROCEDURE — 81003 URINALYSIS AUTO W/O SCOPE: CPT

## 2020-10-08 PROCEDURE — 77001 FLUOROGUIDE FOR VEIN DEVICE: CPT | Mod: 26,59

## 2020-10-08 PROCEDURE — 99285 EMERGENCY DEPT VISIT HI MDM: CPT | Mod: 25

## 2020-10-08 PROCEDURE — 74177 CT ABD & PELVIS W/CONTRAST: CPT

## 2020-10-08 PROCEDURE — 87206 SMEAR FLUORESCENT/ACID STAI: CPT

## 2020-10-08 PROCEDURE — 86901 BLOOD TYPING SEROLOGIC RH(D): CPT

## 2020-10-08 PROCEDURE — 36415 COLL VENOUS BLD VENIPUNCTURE: CPT

## 2020-10-08 PROCEDURE — 86900 BLOOD TYPING SEROLOGIC ABO: CPT

## 2020-10-08 PROCEDURE — 77001 FLUOROGUIDE FOR VEIN DEVICE: CPT

## 2020-10-08 PROCEDURE — 88305 TISSUE EXAM BY PATHOLOGIST: CPT

## 2020-10-08 PROCEDURE — 85730 THROMBOPLASTIN TIME PARTIAL: CPT

## 2020-10-08 PROCEDURE — 84484 ASSAY OF TROPONIN QUANT: CPT

## 2020-10-08 PROCEDURE — 88341 IMHCHEM/IMCYTCHM EA ADD ANTB: CPT

## 2020-10-08 PROCEDURE — 76937 US GUIDE VASCULAR ACCESS: CPT

## 2020-10-08 PROCEDURE — 78815 PET IMAGE W/CT SKULL-THIGH: CPT

## 2020-10-08 PROCEDURE — C1769: CPT

## 2020-10-08 PROCEDURE — 83540 ASSAY OF IRON: CPT

## 2020-10-08 PROCEDURE — 87015 SPECIMEN INFECT AGNT CONCNTJ: CPT

## 2020-10-08 PROCEDURE — 36561 INSERT TUNNELED CV CATH: CPT

## 2020-10-08 PROCEDURE — 87086 URINE CULTURE/COLONY COUNT: CPT

## 2020-10-08 PROCEDURE — 86140 C-REACTIVE PROTEIN: CPT

## 2020-10-08 PROCEDURE — 93306 TTE W/DOPPLER COMPLETE: CPT

## 2020-10-08 PROCEDURE — 84466 ASSAY OF TRANSFERRIN: CPT

## 2020-10-08 PROCEDURE — 82550 ASSAY OF CK (CPK): CPT

## 2020-10-08 PROCEDURE — 71045 X-RAY EXAM CHEST 1 VIEW: CPT

## 2020-10-08 PROCEDURE — 82164 ANGIOTENSIN I ENZYME TEST: CPT

## 2020-10-08 PROCEDURE — 86480 TB TEST CELL IMMUN MEASURE: CPT

## 2020-10-08 PROCEDURE — 94640 AIRWAY INHALATION TREATMENT: CPT

## 2020-10-08 PROCEDURE — 80053 COMPREHEN METABOLIC PANEL: CPT

## 2020-10-08 PROCEDURE — 82955 ASSAY OF G6PD ENZYME: CPT

## 2020-10-08 PROCEDURE — 83550 IRON BINDING TEST: CPT

## 2020-10-08 PROCEDURE — 82105 ALPHA-FETOPROTEIN SERUM: CPT

## 2020-10-08 PROCEDURE — 76937 US GUIDE VASCULAR ACCESS: CPT | Mod: 26

## 2020-10-08 PROCEDURE — 85610 PROTHROMBIN TIME: CPT

## 2020-10-08 PROCEDURE — 38505 NEEDLE BIOPSY LYMPH NODES: CPT

## 2020-10-08 PROCEDURE — 76870 US EXAM SCROTUM: CPT

## 2020-10-08 PROCEDURE — 85652 RBC SED RATE AUTOMATED: CPT

## 2020-10-08 PROCEDURE — 83605 ASSAY OF LACTIC ACID: CPT

## 2020-10-08 PROCEDURE — 99153 MOD SED SAME PHYS/QHP EA: CPT

## 2020-10-08 PROCEDURE — 36561 INSERT TUNNELED CV CATH: CPT | Mod: RT

## 2020-10-08 PROCEDURE — 88341 IMHCHEM/IMCYTCHM EA ADD ANTB: CPT | Mod: 26

## 2020-10-08 PROCEDURE — 76942 ECHO GUIDE FOR BIOPSY: CPT | Mod: 26,59

## 2020-10-08 RX ORDER — CEFPODOXIME PROXETIL 100 MG
1 TABLET ORAL
Qty: 2 | Refills: 0
Start: 2020-10-08 | End: 2020-10-09

## 2020-10-08 NOTE — DIETITIAN INITIAL EVALUATION ADULT. - ADD RECOMMEND
1. When medically able, resume regular diet. Monitor %PO intake 2. Trend wts 2-3x per week 3. Monitor lytes and replete 4. RD to remain available prn

## 2020-10-08 NOTE — DIETITIAN INITIAL EVALUATION ADULT. - PROBLEM SELECTOR PLAN 6
F: 110cc/hr NS  E: replete PRN  N: NPO for possible procedure  DVT ppx: holding for now in case pt undergoes procedure, pls restart after  Dispo: [facility]  CODE: Full

## 2020-10-08 NOTE — DIETITIAN INITIAL EVALUATION ADULT. - ENERGY NEEDS
Height: 72" Weight: 170lbs, IBW 178lbs+/-10%, %IBW 96%, BMI 23.1,  ABW used for calculations as pt between % of IBW

## 2020-10-08 NOTE — DISCHARGE NOTE NURSING/CASE MANAGEMENT/SOCIAL WORK - PATIENT PORTAL LINK FT
You can access the FollowMyHealth Patient Portal offered by [facility] by registering at the following website: [email or web address]. By joining [facility]’s FollowMyHealth portal, you will also be able to view your health information using other applications (apps) compatible with our system.

## 2020-10-08 NOTE — PROGRESS NOTE ADULT - SUBJECTIVE AND OBJECTIVE BOX
Patient is a 35y old  Male who presents with a chief complaint of dry cough and mediastinal mass (05 Oct 2020 08:59)      INTERVAL HPI/OVERNIGHT EVENTS:    Pt. seen and examined this morning  Pt. c/o cough  Fever resolved  Denies CP, SOB, hemoptysis  CT scan results d/w Pt., Pt. verbalized understanding of possible cancer dx; emotional support provided    Review of Systems: 12 point review of systems otherwise negative    MEDICATIONS  (STANDING):  azithromycin   Tablet 500 milliGRAM(s) Oral every 24 hours  cefTRIAXone   IVPB 1000 milliGRAM(s) IV Intermittent every 24 hours  sodium chloride 0.9%. 1000 milliLiter(s) (110 mL/Hr) IV Continuous <Continuous>  sodium chloride 3%  Inhalation 3 milliLiter(s) Inhalation every 6 hours    MEDICATIONS  (PRN):  acetaminophen   Tablet .. 650 milliGRAM(s) Oral every 6 hours PRN Temp greater or equal to 38C (100.4F)      Allergies    No Known Allergies    Intolerances          Vital Signs Last 24 Hrs  T(C): 37.8 (05 Oct 2020 10:32), Max: 38.6 (04 Oct 2020 17:39)  T(F): 100.1 (05 Oct 2020 10:32), Max: 101.5 (04 Oct 2020 17:39)  HR: 95 (05 Oct 2020 10:32) (89 - 114)  BP: 121/82 (05 Oct 2020 10:32) (119/76 - 148/87)  BP(mean): --  RR: 18 (05 Oct 2020 10:32) (16 - 22)  SpO2: 97% (05 Oct 2020 10:32) (96% - 99%)  CAPILLARY BLOOD GLUCOSE            Physical Exam:  (this morning)  Daily Height in cm: 182.88 (04 Oct 2020 17:22)    Daily   General: well-appearing in NAD, in good spirits  HEENT: MMM  CV:  no JVD  Extremities: no edema B/L LE  Skin:  WWP  Neuro:  AAOx3  rest of exam per housestaff    LABS:                        9.2    3.54  )-----------( 205      ( 05 Oct 2020 06:29 )             28.8     10-05    136  |  102  |  15  ----------------------------<  108<H>  3.8   |  23  |  1.56<H>    Ca    9.2      05 Oct 2020 06:29  Phos  3.3     10-05  Mg     1.8     10-    TPro  6.6  /  Alb  3.4  /  TBili  0.2  /  DBili  x   /  AST  19  /  ALT  21  /  AlkPhos  147<H>  10-05    PT/INR - ( 05 Oct 2020 10:44 )   PT: 14.1 sec;   INR: 1.18          PTT - ( 05 Oct 2020 10:44 )  PTT:32.6 sec  Urinalysis Basic - ( 04 Oct 2020 20:01 )    Color: Yellow / Appearance: Clear / S.010 / pH: x  Gluc: x / Ketone: NEGATIVE  / Bili: NEGATIVE / Urobili: 0.2 E.U./dL   Blood: x / Protein: NEGATIVE mg/dL / Nitrite: NEGATIVE   Leuk Esterase: NEGATIVE / RBC: x / WBC x   Sq Epi: x / Non Sq Epi: x / Bacteria: x          RADIOLOGY & ADDITIONAL TESTS:    ---------------------------------------------------------------------------  I personally reviewed: [  ]EKG   [  ]CXR    [  ] CT    [  ]Other  ---------------------------------------------------------------------------  PLEASE CHECK WHEN PRESENT:     [  ]Heart Failure     [  ] Acute     [  ] Acute on Chronic     [  ] Chronic  -------------------------------------------------------------------     [  ]Diastolic [HFpEF]     [  ]Systolic [HFrEF]     [  ]Combined [HFpEF & HFrEF]     [  ]Other:  -------------------------------------------------------------------  [  ]STEFANY     [  ]ATN     [  ]Reneal Medullary Necrosis     [  ]Renal Cortical Necrosis     [  ]Other Pathological Lesions:    [  ]CKD 1  [  ]CKD 2  [  ]CKD 3  [  ]CKD 4  [  ]CKD 5  [  ]Other  -------------------------------------------------------------------  [  ]Other/Unspecified:    --------------------------------------------------------------------    Abdominal Nutritional Status  [  ]Malnutrition: See Nutrition Note  [  ]Cachexia  [  ]Other:   [  ]Supplement Ordered:  [  ]Morbid Obesity (BMI >=40]        
  Patient is a 35y old  Male who presents with a chief complaint of dry cough and mediastinal mass (06 Oct 2020 11:18)      INTERVAL HPI/OVERNIGHT EVENTS:    Pt. seen and examined earlier today  Pt. c/o cough, mostly unchanged  Denies F/C, CP, SOB, hemoptysis    Review of Systems: 12 point review of systems otherwise negative    MEDICATIONS  (STANDING):  azithromycin   Tablet 500 milliGRAM(s) Oral every 24 hours  cefTRIAXone   IVPB 1000 milliGRAM(s) IV Intermittent every 24 hours  sodium chloride 0.9%. 1000 milliLiter(s) (110 mL/Hr) IV Continuous <Continuous>  sodium chloride 3%  Inhalation 3 milliLiter(s) Inhalation every 6 hours    MEDICATIONS  (PRN):  acetaminophen   Tablet .. 650 milliGRAM(s) Oral every 6 hours PRN Temp greater or equal to 38C (100.4F), Mild Pain (1 - 3), Moderate Pain (4 - 6)      Allergies    No Known Allergies    Intolerances          Vital Signs Last 24 Hrs  T(C): 36.7 (06 Oct 2020 11:45), Max: 37.5 (05 Oct 2020 19:04)  T(F): 98 (06 Oct 2020 11:45), Max: 99.5 (05 Oct 2020 19:04)  HR: 86 (06 Oct 2020 11:45) (71 - 97)  BP: 121/78 (06 Oct 2020 11:45) (115/80 - 133/86)  BP(mean): --  RR: 18 (06 Oct 2020 11:45) (16 - 18)  SpO2: 98% (06 Oct 2020 11:45) (95% - 98%)  CAPILLARY BLOOD GLUCOSE          10-05 @ 07:01  -  10- @ 07:00  --------------------------------------------------------  IN: 1100 mL / OUT: 0 mL / NET: 1100 mL        Physical Exam:  (earlier today)  Daily     Daily   General: well-appearing in NAD  HEENT: MMM  CV:  no JVD  Extremities: no edema B/L LE  Skin:  WWP  Neuro:  AAOx3  rest of exam per housesta    LABS:                        9.8    3.10  )-----------( 218      ( 06 Oct 2020 08:16 )             31.2     10    139  |  104  |  11  ----------------------------<  103<H>  4.5   |  24  |  1.46<H>    Ca    10.0      06 Oct 2020 08:17  Phos  3.4     10  Mg     2.0     10-06    TPro  6.7  /  Alb  3.4  /  TBili  0.2  /  DBili  x   /  AST  22  /  ALT  24  /  AlkPhos  160<H>  10-06    PT/INR - ( 05 Oct 2020 10:44 )   PT: 14.1 sec;   INR: 1.18          PTT - ( 05 Oct 2020 10:44 )  PTT:32.6 sec  Urinalysis Basic - ( 04 Oct 2020 20:01 )    Color: Yellow / Appearance: Clear / S.010 / pH: x  Gluc: x / Ketone: NEGATIVE  / Bili: NEGATIVE / Urobili: 0.2 E.U./dL   Blood: x / Protein: NEGATIVE mg/dL / Nitrite: NEGATIVE   Leuk Esterase: NEGATIVE / RBC: x / WBC x   Sq Epi: x / Non Sq Epi: x / Bacteria: x          RADIOLOGY & ADDITIONAL TESTS:    ---------------------------------------------------------------------------  I personally reviewed: [  ]EKG   [  ]CXR    [  ] CT    [  ]Other  ---------------------------------------------------------------------------  PLEASE CHECK WHEN PRESENT:     [  ]Heart Failure     [  ] Acute     [  ] Acute on Chronic     [  ] Chronic  -------------------------------------------------------------------     [  ]Diastolic [HFpEF]     [  ]Systolic [HFrEF]     [  ]Combined [HFpEF & HFrEF]     [  ]Other:  -------------------------------------------------------------------  [  ]STEFANY     [  ]ATN     [  ]Reneal Medullary Necrosis     [  ]Renal Cortical Necrosis     [  ]Other Pathological Lesions:    [  ]CKD 1  [  ]CKD 2  [  ]CKD 3  [  ]CKD 4  [  ]CKD 5  [  ]Other  -------------------------------------------------------------------  [  ]Other/Unspecified:    --------------------------------------------------------------------    Abdominal Nutritional Status  [  ]Malnutrition: See Nutrition Note  [  ]Cachexia  [  ]Other:   [  ]Supplement Ordered:  [  ]Morbid Obesity (BMI >=40]        
INTERVAL HPI/OVERNIGHT EVENTS:  Patient was seen and examined at bedside. As per nurse and patient, no o/n events, patient resting comfortably. Pt endorses intermittent coughing through the night; has not requested cough suppressant as his cough usually subsides on its own. Coughing does cause mild chest tightness and diaphragm soreness. Pt also endorses tension headache (VAS 3/10) overnight.     VITAL SIGNS:  T(F): 98 (10-07-20 @ 06:05)  HR: 91 (10-07-20 @ 06:05)  BP: 129/80 (10-07-20 @ 06:05)  RR: 16 (10-07-20 @ 06:05)  SpO2: 98% (10-07-20 @ 06:05)  Wt(kg): --    PHYSICAL EXAM:    Constitutional: WDWN, NAD  HEENT: PERRL, EOMI, sclera non-icteric, neck supple, trachea midline, no masses, no JVD, MMM, good dentition  Respiratory: course breath sounds B/L (R>L)  Cardiovascular: RRR, normal S1S2, no M/R/G  Gastrointestinal: soft, NTND, no masses palpable, BS normal  Extremities: Warm, well perfused, pulses equal bilateral upper and lower extremities, no edema, no clubbing  Neurological: AAOx3, CN Grossly intact  Skin: Normal temperature, warm, dry    MEDICATIONS  (STANDING):  cefTRIAXone   IVPB 1000 milliGRAM(s) IV Intermittent every 24 hours  sodium chloride 0.9%. 1000 milliLiter(s) (110 mL/Hr) IV Continuous <Continuous>    MEDICATIONS  (PRN):  acetaminophen   Tablet .. 650 milliGRAM(s) Oral every 6 hours PRN Temp greater or equal to 38C (100.4F), Mild Pain (1 - 3), Moderate Pain (4 - 6)      Allergies    No Known Allergies    Intolerances        LABS:                        9.4    3.13  )-----------( 214      ( 07 Oct 2020 05:46 )             30.0     10-07    138  |  106  |  11  ----------------------------<  106<H>  4.3   |  23  |  1.37<H>    Ca    9.6      07 Oct 2020 05:46  Phos  3.7     10-07  Mg     1.8     10-07    TPro  6.3  /  Alb  3.0<L>  /  TBili  0.2  /  DBili  x   /  AST  21  /  ALT  21  /  AlkPhos  156<H>  10-07    PT/INR - ( 05 Oct 2020 10:44 )   PT: 14.1 sec;   INR: 1.18          PTT - ( 05 Oct 2020 10:44 )  PTT:32.6 sec      RADIOLOGY & ADDITIONAL TESTS:  CXR   CT Chest   PET Scan
INTERVAL HPI/OVERNIGHT EVENTS:  Patient was seen and examined at bedside. Overnight pt had a headache and some nausea which he attributes to being NPO during the day in anticipation for evening PET scan. Pt states that sometime after eating and having taken Acetaminophen 650mg the headache and nausea resolved. Endorses intermittent non-productive coughing more pronounced at night w/ residual chest tightness/soreness. Pt in no acute distress this morning; was resting comfortably in bed.     VITAL SIGNS:  T(F): 97.9 (10-06-20 @ 06:42)  HR: 71 (10-06-20 @ :42)  BP: 115/80 (10-06-20 @ 06:42)  RR: 16 (10-06-20 @ 06:42)  SpO2: 97% (10-06-20 @ 06:42)  Wt(kg): --    PHYSICAL EXAM:    Constitutional: WDWN, NAD  HEENT: PERRL, EOMI, sclera non-icteric, neck supple, trachea midline, no masses, no JVD, MMM, good dentition  Respiratory: coarse breath sounds bilaterally (R>L)   Cardiovascular: RRR, normal S1S2, no M/R/G  Gastrointestinal: soft, NTND, no masses palpable, BS normal  Extremities: Warm, well perfused, pulses equal bilateral upper and lower extremities, no edema, no clubbing  Neurological: AAOx3, CN Grossly intact  Skin: Normal temperature, warm, dry    MEDICATIONS  (STANDING):  azithromycin   Tablet 500 milliGRAM(s) Oral every 24 hours  cefTRIAXone   IVPB 1000 milliGRAM(s) IV Intermittent every 24 hours  enoxaparin Injectable 40 milliGRAM(s) SubCutaneous once  sodium chloride 0.9%. 1000 milliLiter(s) (110 mL/Hr) IV Continuous <Continuous>  sodium chloride 3%  Inhalation 3 milliLiter(s) Inhalation every 6 hours    MEDICATIONS  (PRN):  acetaminophen   Tablet .. 650 milliGRAM(s) Oral every 6 hours PRN Temp greater or equal to 38C (100.4F), Mild Pain (1 - 3), Moderate Pain (4 - 6)      Allergies    No Known Allergies    Intolerances        LABS:                        9.8    3.10  )-----------( 218      ( 06 Oct 2020 08:16 )             31.2     10-06    139  |  104  |  11  ----------------------------<  103<H>  4.5   |  24  |  1.46<H>    Ca    10.0      06 Oct 2020 08:17  Phos  3.4     10-  Mg     2.0     10-    TPro  6.7  /  Alb  3.4  /  TBili  0.2  /  DBili  x   /  AST  22  /  ALT  24  /  AlkPhos  160<H>  10-06    PT/INR - ( 05 Oct 2020 10:44 )   PT: 14.1 sec;   INR: 1.18          PTT - ( 05 Oct 2020 10:44 )  PTT:32.6 sec  Urinalysis Basic - ( 04 Oct 2020 20:01 )    Color: Yellow / Appearance: Clear / S.010 / pH: x  Gluc: x / Ketone: NEGATIVE  / Bili: NEGATIVE / Urobili: 0.2 E.U./dL   Blood: x / Protein: NEGATIVE mg/dL / Nitrite: NEGATIVE   Leuk Esterase: NEGATIVE / RBC: x / WBC x   Sq Epi: x / Non Sq Epi: x / Bacteria: x        RADIOLOGY & ADDITIONAL TESTS:  Reviewed
INTERVAL HPI/OVERNIGHT EVENTS:  Patient was seen and examined at bedside. Pt states that overnight he had a headache which he attributes to being NPO pending PET scan. He was given Tylenol 650mg     VITAL SIGNS:  T(F): 97.9 (10-06-20 @ 06:42)  HR: 71 (10-06-20 @ 06:42)  BP: 115/80 (10-06-20 @ 06:42)  RR: 16 (10-06-20 @ 06:42)  SpO2: 97% (10-06-20 @ 06:42)  Wt(kg): --    PHYSICAL EXAM:    Constitutional: WDWN, NAD  HEENT: PERRL, EOMI, sclera non-icteric, neck supple, trachea midline, no masses, no JVD, MMM, good dentition  Respiratory: CTA b/l, good air entry b/l, no wheezing, no rhonchi, no rales, without accessory muscle use and no intercostal retractions  Cardiovascular: RRR, normal S1S2, no M/R/G  Gastrointestinal: soft, NTND, no masses palpable, BS normal  Extremities: Warm, well perfused, pulses equal bilateral upper and lower extremities, no edema, no clubbing  Neurological: AAOx3, CN Grossly intact  Skin: Normal temperature, warm, dry    MEDICATIONS  (STANDING):  azithromycin   Tablet 500 milliGRAM(s) Oral every 24 hours  cefTRIAXone   IVPB 1000 milliGRAM(s) IV Intermittent every 24 hours  sodium chloride 0.9%. 1000 milliLiter(s) (110 mL/Hr) IV Continuous <Continuous>  sodium chloride 3%  Inhalation 3 milliLiter(s) Inhalation every 6 hours    MEDICATIONS  (PRN):  acetaminophen   Tablet .. 650 milliGRAM(s) Oral every 6 hours PRN Temp greater or equal to 38C (100.4F), Mild Pain (1 - 3), Moderate Pain (4 - 6)      Allergies    No Known Allergies    Intolerances        LABS:                        9.8    3.10  )-----------( 218      ( 06 Oct 2020 08:16 )             31.2     10-    139  |  104  |  11  ----------------------------<  103<H>  4.5   |  24  |  1.46<H>    Ca    10.0      06 Oct 2020 08:17  Phos  3.4     10-  Mg     2.0     10-    TPro  6.7  /  Alb  3.4  /  TBili  0.2  /  DBili  x   /  AST  22  /  ALT  24  /  AlkPhos  160<H>  10-06    PT/INR - ( 05 Oct 2020 10:44 )   PT: 14.1 sec;   INR: 1.18          PTT - ( 05 Oct 2020 10:44 )  PTT:32.6 sec  Urinalysis Basic - ( 04 Oct 2020 20:01 )    Color: Yellow / Appearance: Clear / S.010 / pH: x  Gluc: x / Ketone: NEGATIVE  / Bili: NEGATIVE / Urobili: 0.2 E.U./dL   Blood: x / Protein: NEGATIVE mg/dL / Nitrite: NEGATIVE   Leuk Esterase: NEGATIVE / RBC: x / WBC x   Sq Epi: x / Non Sq Epi: x / Bacteria: x        RADIOLOGY & ADDITIONAL TESTS:  Reviewed
INTERVAL Hx:   The patient feels ok. No overnight events.  Completed PET scan    HPI: 35 y.o man with no PMH presented to the ED c/o an intermittently dry-to-productive cough (minimal white sputum) for the past 5 months. This was accompanied by intermittent chest discomfort. Also reports drenching night sweats and 10 Lb weight loss. He developed lw grade fever a few days before the admission. He presented to the ER. In the ED patient was found to be febrile and tachycardic, COVID swab negative. CXR  demonstrated mediastinal mass and subsequently underwent CTA which revealed 6cm mediastinal mass with extensive mediastinal lymphadenopathy concerning for lymphoma  Labs demosntrated moderate anemia and lymphopenia.   AFP, BHCG and LDH all WNL.     ROS:  + chest pain and  SOB  + cough  + fever  + night sweats  No nausea/vomiting  + fatigue,  10 Lbs weight loss.\  No leg pain or leg swelling.    ROS is otherwise negative.    PMH/PSH:  PAST MEDICAL & SURGICAL HISTORY:  No pertinent past medical history    History of tonsillectomy    Medications:  MEDICATIONS  (STANDING):  azithromycin   Tablet 500 milliGRAM(s) Oral every 24 hours  cefTRIAXone   IVPB 1000 milliGRAM(s) IV Intermittent every 24 hours  sodium chloride 0.9%. 1000 milliLiter(s) (110 mL/Hr) IV Continuous <Continuous>  sodium chloride 3%  Inhalation 3 milliLiter(s) Inhalation every 6 hours    MEDICATIONS  (PRN):  acetaminophen   Tablet .. 650 milliGRAM(s) Oral every 6 hours PRN Temp greater or equal to 38C (100.4F), Mild Pain (1 - 3), Moderate Pain (4 - 6)    Allergies    No Known Allergies    Intolerances    Exam:  Vital Signs Last 24 Hrs  T(C): 36.6 (08 Oct 2020 05:16), Max: 36.6 (08 Oct 2020 05:16)  T(F): 97.8 (08 Oct 2020 05:16), Max: 97.8 (08 Oct 2020 05:16)  HR: 80 (08 Oct 2020 05:16) (76 - 89)  BP: 113/71 (08 Oct 2020 05:16) (113/71 - 131/81)  BP(mean): --  RR: 16 (08 Oct 2020 05:16) (16 - 16)  SpO2: 98% (08 Oct 2020 05:16) (97% - 98%)      HEENT: pink mucosae, anicteric sclerae  No palpable L supraclavicular lymphadenopathy  COR: regular rhythm rate, no murmurs, rubs or gallops  PULMO: clear to auscultation B/L  ABD: soft, no palpable masses, no splenomegaly  EXT: no edema  NEURO: intact  SKIN: no lesions on visible skin    Labs:  WBC 2.5, Hgb 10.4, Plt 260  Cr 1.37, INR 1.18, PTT 32.6, Alk phos 156      Pertinent imaging studies: reviewed    Assessment:    Mediastinal mass  Anemia/lymphopenia    Plan:    Clinically stable  Work up in progress  PET scan-reviewed personally and discussed with   FDG avid mediastinal/supraclavicular LAD, hepatic lesions, single splenic lesion and osseous lesions  Core biopsy by IR- order placed  Will need port placement- order placed  Further recommendations to follow when tissue diagnosis is obtained  Pt will also need ECHO/ MUGA- order placed    Thank you    Jeana Rosario MD for Denzel De Jesus MD  [phone]    
[facility]    GHADA CORLEY  MRN-[id number]    INTERVAL Hx: The patient feels OK. + cough. No chest pain. He slept OK. No SOB. No nausea or vomiting. Completed PET scan    HPI: 35 y.o man with no PMH presented to the ED c/o an intermittently dry-to-productive cough (minimal white sputum) for the past 5 months. This was accompanied by intermittent chest discomfort. Also reports drenching night sweats and 10 Lb weight loss. He developed lw grade fever a few days before the admission. He presented to the ER. In the ED patient was found to be febrile and tachycardic, COVID swab negative. CXR  demonstrated mediastinal mass and subsequently underwent CTA which revealed 6cm mediastinal mass with extensive mediastinal lymphadenopathy concerning for lymphoma  Labs demosntrated moderate anemia and lymphopenia.   AFP, BHCG and LDH all WNL.     ROS:  + chest pain and  SOB  + cough  + fever  + night sweats  No nausea/vomiting  + fatigue,  10 Lbs weight loss.\  No leg pain or leg swelling.    ROS is otherwise negative.    PMH/PSH:  PAST MEDICAL & SURGICAL HISTORY:  No pertinent past medical history    History of tonsillectomy    Medications:  MEDICATIONS  (STANDING):  azithromycin   Tablet 500 milliGRAM(s) Oral every 24 hours  cefTRIAXone   IVPB 1000 milliGRAM(s) IV Intermittent every 24 hours  sodium chloride 0.9%. 1000 milliLiter(s) (110 mL/Hr) IV Continuous <Continuous>  sodium chloride 3%  Inhalation 3 milliLiter(s) Inhalation every 6 hours    MEDICATIONS  (PRN):  acetaminophen   Tablet .. 650 milliGRAM(s) Oral every 6 hours PRN Temp greater or equal to 38C (100.4F), Mild Pain (1 - 3), Moderate Pain (4 - 6)    Allergies    No Known Allergies    Intolerances    Exam:  ICU Vital Signs Last 24 Hrs  T(C): 36.7 (07 Oct 2020 06:05), Max: 36.8 (06 Oct 2020 22:00)  T(F): 98 (07 Oct 2020 06:05), Max: 98.2 (06 Oct 2020 22:00)  HR: 91 (07 Oct 2020 06:05) (86 - 91)  BP: 129/80 (07 Oct 2020 06:05) (121/78 - 129/80)  BP(mean): --  ABP: --  ABP(mean): --  RR: 16 (07 Oct 2020 06:05) (16 - 18)  SpO2: 98% (07 Oct 2020 06:05) (97% - 98%)      HEENT: pink mucosae, anicteric sclerae  No palpable L supraclavicular lymphadenopathy  COR: regular rhythm rate, no murmurs, rubs or gallops  PULMO: clear to auscultation B/L  ABD: soft, no palpable masses, no splenomegaly  EXT: no edema  NEURO: intact  SKIN: no lesions on visible skin    Labs:                        9.4    3.13  )-----------( 214      ( 07 Oct 2020 05:46 )             30.0         CBC Full  -  ( 07 Oct 2020 05:46 )  WBC Count : 3.13 K/uL  RBC Count : 3.54 M/uL  Hemoglobin : 9.4 g/dL  Hematocrit : 30.0 %  Platelet Count - Automated : 214 K/uL  Mean Cell Volume : 84.7 fl  Mean Cell Hemoglobin : 26.6 pg  Mean Cell Hemoglobin Concentration : 31.3 gm/dL  Auto Neutrophil # : 1.76 K/uL  Auto Lymphocyte # : 0.50 K/uL  Auto Monocyte # : 0.63 K/uL  Auto Eosinophil # : 0.14 K/uL  Auto Basophil # : 0.01 K/uL  Auto Neutrophil % : 56.2 %  Auto Lymphocyte % : 16.0 %  Auto Monocyte % : 20.1 %  Auto Eosinophil % : 4.5 %  Auto Basophil % : 0.3 %        10-07    138  |  106  |  11  ----------------------------<  106<H>  4.3   |  23  |  1.37<H>    Ca    9.6      07 Oct 2020 05:46  Phos  3.7     10-07  Mg     1.8     10-07    TPro  6.3  /  Alb  3.0<L>  /  TBili  0.2  /  DBili  x   /  AST  21  /  ALT  21  /  AlkPhos  156<H>  10-07        PT/INR - ( 05 Oct 2020 10:44 )   PT: 14.1 sec;   INR: 1.18          PTT - ( 05 Oct 2020 10:44 )  PTT:32.6 sec            Pertinent imaging studies: reviewed    Assessment:    Mediastinal mass  Anemia/lymphopenia    Plan:    Clinically stable  Work up in progress  We reviewed and discussed PET scan-reviewed personally and discussed with   FDG avid mediastinal/supraclavicular LAD, hepatic lesions, single splenic lesion and osseous lesions  Core biopsy by IR   I discussed Infusaport placement with the patient-he is agreeable  Further recommendations to follow when tissue diagnosis is obtained    Thank you    Denzel De Jesus MD  [phone]    
INTERVAL HPI/OVERNIGHT EVENTS:  Patient was seen and examined at bedside. As per nurse and patient, no o/n events, patient resting comfortably. No complaints at this time. Patient denies: fever, chills, dizziness, weakness, HA, Changes in vision, CP, palpitations, SOB, cough, N/V/D/C, dysuria, changes in bowel movements, LE edema. ROS otherwise negative.    VITAL SIGNS:  T(F): 100.1 (10-05-20 @ 10:32)  HR: 95 (10-05-20 @ 10:32)  BP: 121/82 (10-05-20 @ 10:32)  RR: 18 (10-05-20 @ 10:32)  SpO2: 97% (10-05-20 @ 10:32)  Wt(kg): --    PHYSICAL EXAM:    Constitutional: WDWN, NAD  HEENT: PERRL, EOMI, sclera non-icteric, neck supple, trachea midline, no masses, no JVD, MMM, good dentition  Respiratory: Course breath sounds, bilateral wheezing on auscultation R>L  Cardiovascular: RRR, normal S1S2, no M/R/G  Gastrointestinal: soft, NTND, no masses palpable, BS normal  Extremities: Warm, well perfused, pulses equal bilateral upper and lower extremities, no edema, no clubbing  Neurological: AAOx3, CN Grossly intact  Skin: Normal temperature, warm, dry    MEDICATIONS  (STANDING):  azithromycin   Tablet 500 milliGRAM(s) Oral every 24 hours  cefTRIAXone   IVPB 1000 milliGRAM(s) IV Intermittent every 24 hours  sodium chloride 0.9%. 1000 milliLiter(s) (110 mL/Hr) IV Continuous <Continuous>  sodium chloride 3%  Inhalation 3 milliLiter(s) Inhalation every 6 hours    MEDICATIONS  (PRN):  acetaminophen   Tablet .. 650 milliGRAM(s) Oral every 6 hours PRN Temp greater or equal to 38C (100.4F)      Allergies    No Known Allergies    Intolerances        LABS:                        9.2    3.54  )-----------( 205      ( 05 Oct 2020 06:29 )             28.8     10-05    136  |  102  |  15  ----------------------------<  108<H>  3.8   |  23  |  1.56<H>    Ca    9.2      05 Oct 2020 06:29  Phos  3.3     10-05  Mg     1.8     10-05    TPro  6.6  /  Alb  3.4  /  TBili  0.2  /  DBili  x   /  AST  19  /  ALT  21  /  AlkPhos  147<H>  10-05    PT/INR - ( 05 Oct 2020 10:44 )   PT: 14.1 sec;   INR: 1.18          PTT - ( 05 Oct 2020 10:44 )  PTT:32.6 sec  Urinalysis Basic - ( 04 Oct 2020 20:01 )    Color: Yellow / Appearance: Clear / S.010 / pH: x  Gluc: x / Ketone: NEGATIVE  / Bili: NEGATIVE / Urobili: 0.2 E.U./dL   Blood: x / Protein: NEGATIVE mg/dL / Nitrite: NEGATIVE   Leuk Esterase: NEGATIVE / RBC: x / WBC x   Sq Epi: x / Non Sq Epi: x / Bacteria: x        RADIOLOGY & ADDITIONAL TESTS:  Reviewed

## 2020-10-08 NOTE — DIETITIAN INITIAL EVALUATION ADULT. - PROBLEM SELECTOR PLAN 2
Pt with 5 month history of cough and no prior imaging or workup. Differential includes lymphoma, thymic tumor, germ cell tumor, sarcoidosis.  -hem/onc consult in am (please call Dr. De Jesus for new consult [phone])  -pulm consult in am  -CT surg consult for possible bx of mediastinal mass (already contacted, Dr. Toure will speak to IR first thing this morning, appreciate further recs)  -testicular ultrasound  -antiacetylcholine receptor abs (to r/o thymic tumor)  -alpha-fetoprotein (to r/o germ cell tumor)  -b-HCG (to r/o germ cell tumor)  -LDH (lymphoma)  -serum ACE level (r/o sarcoidosis)  -keep pt NPO for potential biopsy

## 2020-10-08 NOTE — PROGRESS NOTE ADULT - REASON FOR ADMISSION
dry cough and mediastinal mass

## 2020-10-08 NOTE — DIETITIAN INITIAL EVALUATION ADULT. - PROBLEM SELECTOR PLAN 5
ADDENDUM: reports drinking at work, about 4 drinks per night, usually shots, mixed drinks, denies w/d sxs, CIWA 0, has not had w/d during COVID pandemic when pt was off work. monitor CIWA

## 2020-10-08 NOTE — DIETITIAN INITIAL EVALUATION ADULT. - PROBLEM SELECTOR PLAN 1
On admission, pt met 2/4 SIRS criteria for Tmax 101.5 and , lactate wnl. Received 975mg Tylenol, 1g Ceftriaxone, 1L LR and 2.4L NS. Fever and tachycardia likely 2/2 mediastinal mass/malignant process, less likely 2/2 infection as there's no obvious infectious etiology. HIV negative, COVID negative.  -will treat for CAP with Ceftriaxone 1mg and azithromycin 500mg x3d   -Tylenol q6h PRN for fever  -f/u BCx, UCx, sputum Cx  -r/o TB (f/u quantiferon, sputum AFB x3 - may need sputum induction)    ADDENDUM: treat for possible superimposed PNA

## 2020-10-08 NOTE — DIETITIAN INITIAL EVALUATION ADULT. - OTHER INFO
35 year old male, former smoker (>15 yr hx; quit 3yrs ago, recently restarted occasional 1-2 cigarettes daily), with no significant PMHx presented to [facility] ED yesterday 10/4 evening complaining of non-productive cough x 5 months. In the ED patient was found to be febrile and tachycardic, COVID swab negative, labs significant for leukopenia and anemia. Patient has CXR which demonstrated mediastinal mass and subsequently underwent CTA which revealed 6cm mediastinal mass with extensive mediastinal lymphadenopathy concerning for lymphoma. PET scan demonstrates liver/spleen/bone lesions increasing likelihood of lymphoma. He was admitted to medicine for further evaluation. Pt out of room for procedure today, per team scheduled for IR procedures 10/8. Unable to obtain wt/intake hx at this time. Per H&P, pt reporting 10lb wt loss since onset of symptoms. Pt currently NPO for procedure, previously on regular diet. No reports of diet intolerance. Per flowsheet, GI wdl. No edema noted. Skin wdl. On pain regimen. Please see recs below. Will continue to follow per RD protocol.

## 2020-10-08 NOTE — DIETITIAN INITIAL EVALUATION ADULT. - PROBLEM SELECTOR PLAN 4
Pt with Cr 1.94 (unknown baseline, however also s/p CT C/A/P with contrast).  -monitor  -maintenance fluids  -avoid nephrotoxic medications

## 2020-10-10 LAB
CULTURE RESULTS: SIGNIFICANT CHANGE UP
SPECIMEN SOURCE: SIGNIFICANT CHANGE UP

## 2020-10-11 LAB
CULTURE RESULTS: SIGNIFICANT CHANGE UP
SPECIMEN SOURCE: SIGNIFICANT CHANGE UP

## 2020-10-12 LAB
G6PD RBC-CCNC: 13.6 U/G HGB — SIGNIFICANT CHANGE UP (ref 7–20.5)
SURGICAL PATHOLOGY STUDY: SIGNIFICANT CHANGE UP

## 2020-10-13 DIAGNOSIS — J98.59 OTHER DISEASES OF MEDIASTINUM, NOT ELSEWHERE CLASSIFIED: ICD-10-CM

## 2020-10-13 DIAGNOSIS — A41.9 SEPSIS, UNSPECIFIED ORGANISM: ICD-10-CM

## 2020-10-13 DIAGNOSIS — F17.210 NICOTINE DEPENDENCE, CIGARETTES, UNCOMPLICATED: ICD-10-CM

## 2020-10-13 DIAGNOSIS — J18.9 PNEUMONIA, UNSPECIFIED ORGANISM: ICD-10-CM

## 2020-10-13 DIAGNOSIS — C81.90 HODGKIN LYMPHOMA, UNSPECIFIED, UNSPECIFIED SITE: ICD-10-CM

## 2020-10-13 DIAGNOSIS — R79.89 OTHER SPECIFIED ABNORMAL FINDINGS OF BLOOD CHEMISTRY: ICD-10-CM

## 2020-10-13 DIAGNOSIS — N17.9 ACUTE KIDNEY FAILURE, UNSPECIFIED: ICD-10-CM

## 2020-10-13 DIAGNOSIS — F10.10 ALCOHOL ABUSE, UNCOMPLICATED: ICD-10-CM

## 2020-10-13 DIAGNOSIS — D64.9 ANEMIA, UNSPECIFIED: ICD-10-CM

## 2020-10-14 ENCOUNTER — APPOINTMENT (OUTPATIENT)
Dept: UROLOGY | Facility: CLINIC | Age: 35
End: 2020-10-14
Payer: COMMERCIAL

## 2020-10-14 ENCOUNTER — TRANSCRIPTION ENCOUNTER (OUTPATIENT)
Age: 35
End: 2020-10-14

## 2020-10-14 PROCEDURE — 99203 OFFICE O/P NEW LOW 30 MIN: CPT | Mod: 95

## 2020-10-14 NOTE — ASSESSMENT
[FreeTextEntry1] : fertility preservation\par presumptive oncologic diagnosis\par reviewed role semen analysis with cryopreservaton\par advised multiple vials\par discussed local sperm zeng\par discussed post chemo hypogonadism\par discussed post chemo ED\par we are avaialble as needed - pt will call if questions

## 2020-10-14 NOTE — HISTORY OF PRESENT ILLNESS
[Home] : at home, [unfilled] , at the time of the visit. [Medical Office: ([facility])___] : at the medical office located in  [Verbal consent obtained from patient] : the patient, [unfilled] [FreeTextEntry1] : The patient-doctor relationship has been established in a face to face fashion via real time video/audio HIPAA compliant communication using telemedicine software.  The patient's identity has been confirmed.  The patient was previously emailed a copy of the telemedicine consent.  They have had a chance to review and has now given verbal consent and has requested care to be assessed and treated through telemedicine and understands there maybe limitations in this process and they may need further follow up care in the office and or hospital settings.   \par  \par 35M in a relationship with a long term girlfriend (32F) who is 13 weeks pregnant presents for discussion for fertility preservation. recently underwent biopsy for lymphadenopathy. +weight loss. ?Hodgkin's lymphoma. unclear if he is getting radiation or chemotherapy. no ED. no ejaculatory dysfucntion. strong libido. good energy levels. no hematuria. no dysuria. good FOS. no family hsitory prostate kdieny bladder cancer

## 2020-10-19 ENCOUNTER — APPOINTMENT (OUTPATIENT)
Dept: OTOLARYNGOLOGY | Facility: CLINIC | Age: 35
End: 2020-10-19
Payer: COMMERCIAL

## 2020-10-19 ENCOUNTER — EMERGENCY (EMERGENCY)
Facility: HOSPITAL | Age: 35
LOS: 1 days | Discharge: HOME | End: 2020-10-19
Attending: EMERGENCY MEDICINE | Admitting: EMERGENCY MEDICINE
Payer: COMMERCIAL

## 2020-10-19 VITALS
WEIGHT: 164.91 LBS | HEART RATE: 100 BPM | OXYGEN SATURATION: 99 % | DIASTOLIC BLOOD PRESSURE: 90 MMHG | HEIGHT: 72 IN | TEMPERATURE: 99 F | SYSTOLIC BLOOD PRESSURE: 132 MMHG | RESPIRATION RATE: 18 BRPM

## 2020-10-19 DIAGNOSIS — Z90.89 ACQUIRED ABSENCE OF OTHER ORGANS: Chronic | ICD-10-CM

## 2020-10-19 DIAGNOSIS — Z20.828 CONTACT WITH AND (SUSPECTED) EXPOSURE TO OTHER VIRAL COMMUNICABLE DISEASES: ICD-10-CM

## 2020-10-19 PROCEDURE — 99072 ADDL SUPL MATRL&STAF TM PHE: CPT

## 2020-10-19 PROCEDURE — 31575 DIAGNOSTIC LARYNGOSCOPY: CPT

## 2020-10-19 PROCEDURE — 99204 OFFICE O/P NEW MOD 45 MIN: CPT | Mod: 25

## 2020-10-19 PROCEDURE — 99283 EMERGENCY DEPT VISIT LOW MDM: CPT | Mod: CR

## 2020-10-19 NOTE — ED PROVIDER NOTE - PHYSICAL EXAMINATION
Gen - WDWN, NAD, comfortable and non-toxic appearing  Skin - warm, dry, intact   HEENT - Airway patent  CV - S1S2, R/R/R  Resp - Breathing comfortably on RA. Lungs CTA B/L.

## 2020-10-19 NOTE — ED PROVIDER NOTE - PATIENT PORTAL LINK FT
You can access the FollowMyHealth Patient Portal offered by [facility] by registering at the following website: [email or web address]. By joining [employer]’s FollowMyHealth portal, you will also be able to view your health information using other applications (apps) compatible with our system.

## 2020-10-19 NOTE — ED PROVIDER NOTE - CLINICAL SUMMARY MEDICAL DECISION MAKING FREE TEXT BOX
Pt presents to the ED for COVID-19 testing. Pt denies medical complaints or symptoms. No vital sign derangements. Will swab for COVID-19 and discharge. Pt agrees to receiving results electronically.

## 2020-10-19 NOTE — ED PROVIDER NOTE - NSFOLLOWUPINSTRUCTIONS_ED_ALL_ED_FT
COVID-19 (Coronavirus Disease 2019)    WHAT YOU NEED TO KNOW:    COVID-19 is the disease caused by the novel (new) coronavirus first discovered in December 2019. Coronaviruses generally cause upper respiratory (nose, throat, and lung) infections, such as a cold. The new virus can also cause serious lower respiratory conditions, such as pneumonia or acute respiratory distress syndrome (ARDS). Anyone can develop serious problems from the new virus, but your risk is higher if you are 65 or older. A weak immune system, diabetes, or a heart or lung condition can also increase your risk.    DISCHARGE INSTRUCTIONS:    If you think you or someone you know may be infected: Do the following to protect others:   •If emergency care is needed, tell the  about the possible infection, or call ahead and tell the emergency department.      •Call a healthcare provider for instructions if symptoms are mild. Anyone who may be infected should not arrive without calling first. The provider will need to protect staff members and other patients.      •The person who may be infected needs to wear a face covering while getting medical care. This will help lower the risk of infecting others. Coverings are not used for anyone who is younger than 2 years, has breathing problems, or cannot remove it. The provider can give you instructions for anyone who cannot wear a covering.      Call your local emergency number (911 in the [place]) or go to the emergency department if:   •You have trouble breathing or shortness of breath at rest.      •You have chest pain or pressure that lasts longer than 5 minutes.      •You become confused or hard to wake.      •Your lips or face are blue.      •You have a fever of 104°F (40°C) or higher.      Call your doctor if:   •You do not have symptoms of COVID-19 but had close physical contact within 14 days with someone who tested positive.      •You have questions or concerns about your condition or care.      Medicines: You may need any of the following:   •Decongestants help reduce nasal congestion and help you breathe more easily. If you take decongestant pills, they may make you feel restless or cause problems with your sleep. Do not use decongestant sprays for more than a few days.      •Cough suppressants help reduce coughing. Ask your healthcare provider which type of cough medicine is best for you.      •Acetaminophen decreases pain and fever. It is available without a doctor's order. Ask how much to take and how often to take it. Follow directions. Read the labels of all other medicines you are using to see if they also contain acetaminophen, or ask your doctor or pharmacist. Acetaminophen can cause liver damage if not taken correctly. Do not use more than 4 grams (4,000 milligrams) total of acetaminophen in one day.       •NSAIDs, such as ibuprofen, help decrease swelling, pain, and fever. NSAIDs can cause stomach bleeding or kidney problems in certain people. If you take blood thinner medicine, always ask your healthcare provider if NSAIDs are safe for you. Always read the medicine label and follow directions.      •Take your medicine as directed. Contact your healthcare provider if you think your medicine is not helping or if you have side effects. Tell him or her if you are allergic to any medicine. Keep a list of the medicines, vitamins, and herbs you take. Include the amounts, and when and why you take them. Bring the list or the pill bottles to follow-up visits. Carry your medicine list with you in case of an emergency.      How the 2019 coronavirus spreads: The virus spreads quickly and easily. You can become infected if you are in contact with a large amount of the virus, even for a short time. You can also become infected by being around a small amount of virus for a long time. The following are ways the virus is thought to spread, but more information may be coming:   •Droplets are the most common way all coronaviruses spread. The virus can travel in droplets that form when a person talks, coughs, or sneezes. Anyone who breathes in the droplets or gets them in his or her eyes can become infected with the virus. Close personal contact with an infected person is thought to be the main way the virus spreads. Close personal contact means you are within 6 feet (2 meters) of the person.      •Person-to-person contact can spread the virus. For example, a person with the virus on his or her hands can spread it by shaking hands with someone. At this time, it does not appear that the virus can be passed to a baby during pregnancy or delivery. The baby can be infected after he or she is born through person-to-person contact. The virus also does not appear to spread in breast milk. If you are pregnant or breastfeeding, talk to your healthcare provider or obstetrician about any concerns you have.      •The virus can stay on objects and surfaces. A person can get the virus on his or her hands by touching the object or surface. Infection happens if the person then touches his or her eyes or mouth with unwashed hands. It is not yet known how long the virus can stay on an object or surface. That is why it is important to clean all surfaces that are used regularly.      •An infected animal may be able to infect a person who touches it. This may happen at live markets or on a farm.      How everyone can lower the risk for COVID-19: The best way to prevent infection is to avoid anyone who is infected, but this can be hard to do. An infected person can spread the virus before signs or symptoms begin, or even if signs or symptoms never develop. The following can help lower the risk for infection:     Limit the Spread of Infectious Disease     •Wash your hands often throughout the day. Use soap and water. Rub your soapy hands together, lacing your fingers. Wash the front and back of each hand, and in between your fingers. Use the fingers of one hand to scrub under the fingernails of the other hand. Wash for at least 20 seconds. Rinse with warm, running water for several seconds. Then dry your hands with a clean towel or paper towel. Use hand  that contains alcohol if soap and water are not available. Do not touch your eyes, nose, or mouth without washing your hands first. Teach children how to wash their hands and use hand .  Handwashing           •Cover a sneeze or cough. This prevents droplets from traveling from you to others. Turn your face away and cover your mouth and nose with a tissue. Throw the tissue away. Use the bend of your arm if a tissue is not available. Then wash your hands well with soap and water or use hand . Turn and cover your face if you are around someone who is sneezing or coughing. Teach children how to cover a cough or sneeze.      •Follow worldwide, national, and local social distancing guidelines. Social distancing means people avoid close physical contact so the virus cannot spread from one person to another. Keep at least 6 feet (2 meters) between you and others. Also keep this distance from anyone who comes to your home, such as someone making a delivery.      •Make a habit of not touching your face. It is not known how long the virus can stay on objects and surfaces. If you get the virus on your hands, you can transfer it to your eyes, nose, or mouth and become infected. You can also transfer it to objects, surfaces, or people. Be aware of what you touch when you go out. Examples include handrails and elevator buttons. Try not to touch anything with bare hands unless it is necessary. Wash your hands before you leave your home and when you return.      •Clean and disinfect high-touch surfaces and objects often. Use a disinfecting solution or wipes. You can make a solution by diluting 4 teaspoons of bleach in 1 quart (4 cups) of water. Clean and disinfect even if you think no one living in or coming to your home is infected with the virus. You can wipe items with a disinfecting cloth before you bring them into your home. Wash your hands after you handle anything you bring into your home.      •Make your immune system as healthy as possible. A weakened immune system makes you more vulnerable to the new coronavirus. No COVID-19 vaccine is available yet. Vaccines such as the flu and pneumonia vaccines can help your immune system. Your healthcare provider can tell you which vaccines to get, and when to get them. Keep your immune system as strong as possible. Do not smoke. Eat healthy foods, exercise regularly, and try to manage stress. Go to bed and wake up at the same times each day.   Healthy Foods           Social distancing guidelines: National and local social distancing rules vary. Rules may change over time as restrictions are lifted. Restrictions may return if an outbreak happens where you live. It is important to know and follow all current social distancing rules in your area. The following are general guidelines:  •Limit trips out of your home. You may be able to have food, medicines, and other supplies delivered. If possible, have delivered items left at your door or other area. Try not to have someone hand you an item. You will be so close to the person that the virus can spread between you.      •Do not have close physical contact with anyone who does not live in your home. Do not shake hands with, hug, or kiss a person as a greeting. Stand or walk as far from others as possible. If you must use public transportation (such as a bus or subway), try to sit or stand away from others. You can stay safely connected with others through phone calls, e-mail messages, social media websites, and video chats. Check in on anyone who may be having a hard time socially distancing, or who lives alone. Ask administrators at nursing homes or long-term care facilities how you can safely communicate with someone living there.      •Wear a cloth face covering around others who do not live in your home. Face coverings help prevent the virus from spreading to others in droplets. You can use a clear face covering if someone needs to read your lips. This is a cloth covering that has plastic over the mouth area so your lips can be seen. Do not use coverings that have breathing valves or vents. The virus can travel out of the valve or vent and be spread to others. Do not take your covering off to talk, cough, or sneeze. Do not use coverings on children younger than 2 years or on anyone who has breathing problems or cannot remove it.      •Only allow medical or other necessary professionals into your home. Wear your face covering, and remind professionals to wear a face covering. Remind them to wash their hands when they arrive and before they leave. Do not let anyone who does not live in your home in, even if the person is not sick. A person can pass the virus to others before symptoms of COVID-19 begin. Some people never even develop symptoms. Children commonly have mild symptoms or no symptoms. It may be hard to tell a child not to hug or kiss you. Explain that this is how he or she can help you stay healthy.      •Do not go to someone else's home unless it is necessary. Do not go over to visit, even if the person is lonely. Only go if you need to help him or her. Make sure you both wear face coverings while you are there.      •Avoid large gatherings and crowds. Gatherings or crowds of 10 or more individuals can cause the virus to spread. Examples of gatherings include parties, sporting events, [employer] services, and conferences. Crowds may form at beaches, rocha, and tourist attractions. Protect yourself by staying away from large gatherings and crowds.      •Ask your healthcare provider for other ways to have appointments. You may be able to have appointments without having to go into the provider's office. Some providers offer phone, video, or other types of appointments. You may also be able to get prescriptions for a few months of your medicines at a time.      •Stay safe if you must go out to work. You may have a job that can only be done outside your home. Keep physical distance between you and other workers as much as possible. Follow your employer's rules so everyone stays safe.      If you have COVID-19 and are recovering at home: Healthcare providers will give you specific instructions to follow. The following are general guidelines to remind you how to keep others safe until you are well:   •Wash your hands often. Use soap and water as much as possible. You can use hand  that contains alcohol if soap and water are not available. Do not share towels with anyone. If you use paper towels, throw them away in a lined trash can kept in your room or area. Use a covered trash can, if possible.      •Do not go out of your home unless it is necessary. You may have to go to your healthcare provider's office for check-ups or to get prescription refills. Do not arrive at the provider's office without an appointment. Providers have to make their offices safe for staff and other patients.      •Do not have close physical contact with anyone unless it is necessary. Only have close physical contact with a person giving direct care, or a baby or child you must care for. Family members and friends should not visit you. If possible, stay in a separate area or room of your home if you live with others. No one should go into the area or room except to give you care. You can visit with others by phone, video chat, e-mail, or similar systems. It is important to stay connected with others in your life while you recover.      •Wear a face covering while others are near you. This can help prevent droplets from spreading the virus when you talk, sneeze, or cough. Put the covering on before anyone comes into your room or area. Remind the person to cover his or her nose and mouth before going in to provide care for you.      •Do not share items. Do not share dishes, towels, or other items with anyone. Items need to be washed after you use them.      •Protect your baby. Wash your hands with soap and water often throughout the day. Wear a clean face covering while you breastfeed, or while you express or pump breast milk. If possible, ask someone who is well to care for your baby. You can put breast milk in bottles for the person to use, if needed. Talk to your healthcare provider if you have any questions or concerns about caring for or bonding with your baby. He or she will tell you when to bring your baby in for check-ups and vaccines. He or she will also tell you what to do if you think your baby was infected with the new virus.      •Do not handle live animals. Until more is known, it is best not to touch, play with, or handle live animals. Some animals, including pets, have been infected with the new coronavirus. Do not handle or care for animals until you are well. Care includes feeding, petting, and cuddling your pet. Do not let your pet lick you or share your food. Ask someone who is not infected to take care of your pet, if possible. If you must care for a pet, wear a face covering. Wash your hands before and after you give care.      •Follow directions from your healthcare provider for being around others after you recover. You will need to wait at least 10 days after symptoms first appeared. Then you will need to have no fever for 24 hours without fever medicine, and no other symptoms. A loss of taste or smell may continue for several months. It is considered okay to be around others if this is your only symptom. It is not known for sure if or for how long a recovered person can pass the virus to others. Your provider may give you instructions, such as continuing social distancing or wearing a face covering around others.      How to take care of someone who has COVID-19: If the person lives in another home, arrange for a time to give care. Remember to bring a few pairs of disposable gloves and a cloth face covering. The following are general guidelines to help you safely care for anyone who has COVID-19:  •Wash your hands often. Wash before and after you go into the person's home, area, or room. Throw paper towels away in a lined trash can that has a lid, if possible.      •Do not allow others to go near the person. No one should come into the person's home unless it is necessary. If possible, the person should be in a separate area or room if he or she lives with others. Keep the room's door shut unless you need to go in or out. Have others call, video chat, or e-mail the person if he or she is feeling well enough. The person may feel lonely if he or she is kept separate for a long period of time. Safe communication can help him or her stay connected to family and friends.      •Make sure the person's room has good air flow. You may be able to open the window if the weather allows. An air conditioner can also be turned on to help air move.      •Contact the person before you go in to give care. Make sure the person is wearing a face covering. Remind him or her to wash his or her hands with soap and water. He or she can use hand  that contains alcohol if soap and water are not available. Put on a face covering before you go in to give care.      •Wear gloves while you give care and clean. Clean items the person uses often. Clean countertops, cooking surfaces, and the fronts and insides of the microwave and refrigerator. Clean the shower, toilet, the area around the toilet, the sink, the area around the sink, and faucets. Gather used laundry or bedding. Wash and dry items on the warmest settings the fabric allows. Wash dishes and silverware in hot, soapy water or in a .      •Anything you throw away needs to go into a lined trash can. When you need to empty the trash, close the open end of the lining and tie it closed. This helps prevent items the virus is on from spilling out of the trash. Remove your gloves and throw them away. Wash your hands.      Follow up with your doctor as directed: Write down your questions so you remember to ask them during your visits.    For more information:   •[employer] Disease Control and Prevention  [address] [place] [address]  Phone: 1-694.834.8089  Web Address: http://www.cdc.gov           © Copyright [employer] 2020

## 2020-10-21 ENCOUNTER — APPOINTMENT (OUTPATIENT)
Dept: FAMILY MEDICINE | Facility: CLINIC | Age: 35
End: 2020-10-21
Payer: COMMERCIAL

## 2020-10-21 ENCOUNTER — LABORATORY RESULT (OUTPATIENT)
Age: 35
End: 2020-10-21

## 2020-10-21 VITALS
BODY MASS INDEX: 22.35 KG/M2 | WEIGHT: 165 LBS | HEART RATE: 113 BPM | SYSTOLIC BLOOD PRESSURE: 122 MMHG | TEMPERATURE: 98.4 F | DIASTOLIC BLOOD PRESSURE: 79 MMHG | HEIGHT: 72 IN | OXYGEN SATURATION: 99 %

## 2020-10-21 DIAGNOSIS — Z78.9 OTHER SPECIFIED HEALTH STATUS: ICD-10-CM

## 2020-10-21 DIAGNOSIS — Z87.891 PERSONAL HISTORY OF NICOTINE DEPENDENCE: ICD-10-CM

## 2020-10-21 DIAGNOSIS — Z82.5 FAMILY HISTORY OF ASTHMA AND OTHER CHRONIC LOWER RESPIRATORY DISEASES: ICD-10-CM

## 2020-10-21 DIAGNOSIS — Z80.3 FAMILY HISTORY OF MALIGNANT NEOPLASM OF BREAST: ICD-10-CM

## 2020-10-21 PROCEDURE — 36415 COLL VENOUS BLD VENIPUNCTURE: CPT

## 2020-10-21 PROCEDURE — 99204 OFFICE O/P NEW MOD 45 MIN: CPT | Mod: 25

## 2020-10-21 PROCEDURE — 99072 ADDL SUPL MATRL&STAF TM PHE: CPT

## 2020-10-22 ENCOUNTER — TRANSCRIPTION ENCOUNTER (OUTPATIENT)
Age: 35
End: 2020-10-22

## 2020-10-22 LAB
BASOPHILS # BLD AUTO: 0.03 K/UL
BASOPHILS NFR BLD AUTO: 0.8 %
EOSINOPHIL # BLD AUTO: 0.12 K/UL
EOSINOPHIL NFR BLD AUTO: 3.3 %
HCT VFR BLD CALC: 36 %
HGB BLD-MCNC: 11 G/DL
IMM GRANULOCYTES NFR BLD AUTO: 1.9 %
LYMPHOCYTES # BLD AUTO: 0.58 K/UL
LYMPHOCYTES NFR BLD AUTO: 15.8 %
MAN DIFF?: NORMAL
MCHC RBC-ENTMCNC: 26.6 PG
MCHC RBC-ENTMCNC: 30.6 GM/DL
MCV RBC AUTO: 87.2 FL
MONOCYTES # BLD AUTO: 0.59 K/UL
MONOCYTES NFR BLD AUTO: 16 %
NEUTROPHILS # BLD AUTO: 2.29 K/UL
NEUTROPHILS NFR BLD AUTO: 62.2 %
PLATELET # BLD AUTO: 214 K/UL
RBC # BLD: 4.13 M/UL
RBC # FLD: 16.2 %
WBC # FLD AUTO: 3.68 K/UL

## 2020-10-22 NOTE — HISTORY OF PRESENT ILLNESS
[No Pertinent Cardiac History] : no history of aortic stenosis, atrial fibrillation, coronary artery disease, recent myocardial infarction, or implantable device/pacemaker [No Pertinent Pulmonary History] : no history of asthma, COPD, sleep apnea, or smoking [No Adverse Anesthesia Reaction] : no adverse anesthesia reaction in self or family member [(Patient denies any chest pain, claudication, dyspnea on exertion, orthopnea, palpitations or syncope)] : Patient denies any chest pain, claudication, dyspnea on exertion, orthopnea, palpitations or syncope [Good (7-10 METs)] : Good (7-10 METs) [Chronic Anticoagulation] : no chronic anticoagulation [Chronic Kidney Disease] : no chronic kidney disease [Diabetes] : no diabetes [FreeTextEntry1] : lymph node  [FreeTextEntry2] : 10/20/2020 [FreeTextEntry3] : Dr. Escalante [FreeTextEntry4] : 36 yo m presents for clearance. \par 2 weeks ago pt. had pna, [facility] saw something questionable, had a CT, was admitted to [facility], had a PETSCAN. Unsure of what the scans showed, ? diagnosis of hodgkin's lymphoma. \par Denies fevers, chills, cp, sob. \par Active lifestyle- cp, sob with activity. \par Done well with anesthesia.

## 2020-10-22 NOTE — ADDENDUM
[FreeTextEntry1] : 10/22/2020: Pt. is medically optimized for procedure, exam wnl and cbc improved since last collection.

## 2020-10-23 ENCOUNTER — RESULT REVIEW (OUTPATIENT)
Age: 35
End: 2020-10-23

## 2020-10-23 ENCOUNTER — APPOINTMENT (OUTPATIENT)
Dept: OTOLARYNGOLOGY | Facility: HOSPITAL | Age: 35
End: 2020-10-23

## 2020-10-23 ENCOUNTER — NON-APPOINTMENT (OUTPATIENT)
Age: 35
End: 2020-10-23

## 2020-10-23 ENCOUNTER — INPATIENT (INPATIENT)
Facility: HOSPITAL | Age: 35
LOS: 0 days | Discharge: HOME | DRG: 822 | End: 2020-10-24
Attending: SPECIALIST | Admitting: SPECIALIST
Payer: COMMERCIAL

## 2020-10-23 VITALS
TEMPERATURE: 98 F | HEIGHT: 72 IN | RESPIRATION RATE: 16 BRPM | WEIGHT: 162.7 LBS | DIASTOLIC BLOOD PRESSURE: 85 MMHG | SYSTOLIC BLOOD PRESSURE: 127 MMHG | HEART RATE: 94 BPM | OXYGEN SATURATION: 99 %

## 2020-10-23 DIAGNOSIS — Z90.89 ACQUIRED ABSENCE OF OTHER ORGANS: Chronic | ICD-10-CM

## 2020-10-23 DIAGNOSIS — C76.0 MALIGNANT NEOPLASM OF HEAD, FACE AND NECK: ICD-10-CM

## 2020-10-23 DIAGNOSIS — C81.91 HODGKIN LYMPHOMA, UNSPECIFIED, LYMPH NODES OF HEAD, FACE, AND NECK: ICD-10-CM

## 2020-10-23 PROCEDURE — 88342 IMHCHEM/IMCYTCHM 1ST ANTB: CPT | Mod: 26,59

## 2020-10-23 PROCEDURE — 88331 PATH CONSLTJ SURG 1 BLK 1SPC: CPT | Mod: 26

## 2020-10-23 PROCEDURE — 88341 IMHCHEM/IMCYTCHM EA ADD ANTB: CPT | Mod: 26,59

## 2020-10-23 PROCEDURE — 88189 FLOWCYTOMETRY/READ 16 & >: CPT

## 2020-10-23 PROCEDURE — 38542 EXPLORE DEEP NODE(S) NECK: CPT

## 2020-10-23 PROCEDURE — 88307 TISSUE EXAM BY PATHOLOGIST: CPT | Mod: 26

## 2020-10-23 RX ORDER — ONDANSETRON 8 MG/1
4 TABLET, FILM COATED ORAL EVERY 6 HOURS
Refills: 0 | Status: DISCONTINUED | OUTPATIENT
Start: 2020-10-23 | End: 2020-10-24

## 2020-10-23 RX ORDER — OXYCODONE HYDROCHLORIDE 5 MG/1
10 TABLET ORAL EVERY 6 HOURS
Refills: 0 | Status: DISCONTINUED | OUTPATIENT
Start: 2020-10-23 | End: 2020-10-24

## 2020-10-23 RX ORDER — OXYCODONE HYDROCHLORIDE 5 MG/1
5 TABLET ORAL EVERY 6 HOURS
Refills: 0 | Status: DISCONTINUED | OUTPATIENT
Start: 2020-10-23 | End: 2020-10-24

## 2020-10-23 RX ORDER — IBUPROFEN 200 MG
400 TABLET ORAL EVERY 6 HOURS
Refills: 0 | Status: DISCONTINUED | OUTPATIENT
Start: 2020-10-23 | End: 2020-10-24

## 2020-10-23 RX ORDER — HYDROMORPHONE HYDROCHLORIDE 2 MG/ML
0.5 INJECTION INTRAMUSCULAR; INTRAVENOUS; SUBCUTANEOUS
Refills: 0 | Status: DISCONTINUED | OUTPATIENT
Start: 2020-10-23 | End: 2020-10-24

## 2020-10-23 RX ORDER — SODIUM CHLORIDE 9 MG/ML
1000 INJECTION INTRAMUSCULAR; INTRAVENOUS; SUBCUTANEOUS
Refills: 0 | Status: DISCONTINUED | OUTPATIENT
Start: 2020-10-23 | End: 2020-10-24

## 2020-10-23 RX ORDER — ACETAMINOPHEN 500 MG
1000 TABLET ORAL EVERY 6 HOURS
Refills: 0 | Status: DISCONTINUED | OUTPATIENT
Start: 2020-10-23 | End: 2020-10-24

## 2020-10-23 NOTE — PACU DISCHARGE NOTE - COMMENTS
Pt met criteria for discharge -s/p right cervical lymphadenectomy with steri-strips and BUDDY drain- pt able to tolerate po intake -Denies N/V/Pain- pending passing TOV- hemodynamically stable- report given to 9uris nurse- pt left unit via stretcher to 9618-2

## 2020-10-23 NOTE — ASU PATIENT PROFILE, ADULT - PMH
Malignant neoplasm of lung, unspecified laterality, unspecified part of lung    Neck malignant neoplasm

## 2020-10-24 ENCOUNTER — TRANSCRIPTION ENCOUNTER (OUTPATIENT)
Age: 35
End: 2020-10-24

## 2020-10-24 VITALS
OXYGEN SATURATION: 97 % | DIASTOLIC BLOOD PRESSURE: 73 MMHG | TEMPERATURE: 98 F | RESPIRATION RATE: 17 BRPM | SYSTOLIC BLOOD PRESSURE: 117 MMHG | HEART RATE: 67 BPM

## 2020-10-24 NOTE — DISCHARGE NOTE NURSING/CASE MANAGEMENT/SOCIAL WORK - PATIENT PORTAL LINK FT
You can access the FollowMyHealth Patient Portal offered by [facility] by registering at the following website: [email or web address]. By joining [employer]’s FollowMyHealth portal, you will also be able to view your health information using other applications (apps) compatible with our system.

## 2020-10-26 PROCEDURE — 88307 TISSUE EXAM BY PATHOLOGIST: CPT

## 2020-10-26 PROCEDURE — 88341 IMHCHEM/IMCYTCHM EA ADD ANTB: CPT

## 2020-10-26 PROCEDURE — 88331 PATH CONSLTJ SURG 1 BLK 1SPC: CPT

## 2020-10-28 LAB — SURGICAL PATHOLOGY STUDY: SIGNIFICANT CHANGE UP

## 2020-10-29 PROBLEM — C76.0 MALIGNANT NEOPLASM OF HEAD, FACE AND NECK: Chronic | Status: ACTIVE | Noted: 2020-10-23

## 2020-10-29 PROBLEM — C34.90 MALIGNANT NEOPLASM OF UNSPECIFIED PART OF UNSPECIFIED BRONCHUS OR LUNG: Chronic | Status: ACTIVE | Noted: 2020-10-23

## 2020-11-02 ENCOUNTER — APPOINTMENT (OUTPATIENT)
Dept: OTOLARYNGOLOGY | Facility: CLINIC | Age: 35
End: 2020-11-02
Payer: COMMERCIAL

## 2020-11-02 VITALS
RESPIRATION RATE: 20 BRPM | TEMPERATURE: 96.7 F | WEIGHT: 164 LBS | DIASTOLIC BLOOD PRESSURE: 87 MMHG | HEIGHT: 71 IN | HEART RATE: 77 BPM | SYSTOLIC BLOOD PRESSURE: 147 MMHG | BODY MASS INDEX: 22.96 KG/M2

## 2020-11-02 PROCEDURE — 99072 ADDL SUPL MATRL&STAF TM PHE: CPT

## 2020-11-02 PROCEDURE — 31575 DIAGNOSTIC LARYNGOSCOPY: CPT | Mod: 79

## 2020-11-05 NOTE — CHART NOTE - NSCHARTNOTEFT_GEN_A_CORE
I was called by lab on 11/1 re: growth of AFBs on AFB culture for this patient. Spoke with Dr. Zhou on 11/2 who said that this patient has changed care to see another physician. Reviewed cultures again today, resulting as ALMA. Spoke with the patient today, he said he is being seen by Dr. Barragan at [facility] Phone number [phone]. I spoke with Dr. Barragan, who confirmed he is now treating this patient, and informed him of results. Faxed the microbiology report to Dr. Barragan's office [phone].

## 2020-11-25 LAB
CULTURE RESULTS: SIGNIFICANT CHANGE UP
SPECIMEN SOURCE: SIGNIFICANT CHANGE UP

## 2020-12-16 LAB
CULTURE RESULTS: SIGNIFICANT CHANGE UP
SPECIMEN SOURCE: SIGNIFICANT CHANGE UP

## 2021-03-08 ENCOUNTER — APPOINTMENT (OUTPATIENT)
Dept: OTOLARYNGOLOGY | Facility: CLINIC | Age: 36
End: 2021-03-08

## 2021-04-08 NOTE — DISCHARGE NOTE PROVIDER - NSDCCONDITION_GEN_ALL_CORE
Additional Notes: Patient consent was obtained to proceed with the visit and recommended plan of care after discussion of all risks and benefits, including the risks of COVID-19 exposure.
Detail Level: Simple
Stable

## 2021-09-09 ENCOUNTER — EMERGENCY (EMERGENCY)
Facility: HOSPITAL | Age: 36
LOS: 1 days | Discharge: HOME | End: 2021-09-09
Admitting: EMERGENCY MEDICINE
Payer: COMMERCIAL

## 2021-09-09 VITALS
HEART RATE: 60 BPM | SYSTOLIC BLOOD PRESSURE: 110 MMHG | TEMPERATURE: 98 F | DIASTOLIC BLOOD PRESSURE: 75 MMHG | RESPIRATION RATE: 15 BRPM | HEIGHT: 72 IN | OXYGEN SATURATION: 97 % | WEIGHT: 169.98 LBS

## 2021-09-09 DIAGNOSIS — C34.90 MALIGNANT NEOPLASM OF UNSPECIFIED PART OF UNSPECIFIED BRONCHUS OR LUNG: ICD-10-CM

## 2021-09-09 DIAGNOSIS — C76.0 MALIGNANT NEOPLASM OF HEAD, FACE AND NECK: ICD-10-CM

## 2021-09-09 DIAGNOSIS — R21 RASH AND OTHER NONSPECIFIC SKIN ERUPTION: ICD-10-CM

## 2021-09-09 DIAGNOSIS — C85.90 NON-HODGKIN LYMPHOMA, UNSPECIFIED, UNSPECIFIED SITE: ICD-10-CM

## 2021-09-09 DIAGNOSIS — Z90.89 ACQUIRED ABSENCE OF OTHER ORGANS: ICD-10-CM

## 2021-09-09 DIAGNOSIS — B02.9 ZOSTER WITHOUT COMPLICATIONS: ICD-10-CM

## 2021-09-09 DIAGNOSIS — Z90.89 ACQUIRED ABSENCE OF OTHER ORGANS: Chronic | ICD-10-CM

## 2021-09-09 PROCEDURE — 99283 EMERGENCY DEPT VISIT LOW MDM: CPT

## 2021-09-09 RX ORDER — OXYCODONE AND ACETAMINOPHEN 5; 325 MG/1; MG/1
1 TABLET ORAL
Qty: 8 | Refills: 0
Start: 2021-09-09

## 2021-09-09 RX ORDER — VALACYCLOVIR 500 MG/1
1000 TABLET, FILM COATED ORAL ONCE
Refills: 0 | Status: COMPLETED | OUTPATIENT
Start: 2021-09-09 | End: 2021-09-09

## 2021-09-09 RX ORDER — IBUPROFEN 200 MG
1 TABLET ORAL
Qty: 30 | Refills: 0
Start: 2021-09-09

## 2021-09-09 RX ORDER — VALACYCLOVIR 500 MG/1
1 TABLET, FILM COATED ORAL
Qty: 21 | Refills: 0
Start: 2021-09-09 | End: 2021-09-15

## 2021-09-09 RX ORDER — IBUPROFEN 200 MG
600 TABLET ORAL ONCE
Refills: 0 | Status: COMPLETED | OUTPATIENT
Start: 2021-09-09 | End: 2021-09-09

## 2021-09-09 RX ADMIN — Medication 600 MILLIGRAM(S): at 12:46

## 2021-09-09 RX ADMIN — VALACYCLOVIR 1000 MILLIGRAM(S): 500 TABLET, FILM COATED ORAL at 12:46

## 2021-09-09 NOTE — ED PROVIDER NOTE - OBJECTIVE STATEMENT
37 y/o male with PMHx of Non-Hodgkin's lymphoma (in remission, last chemo treatment was in April 2021) presents to the ED with complaints of painful rash to right back and right torso x 2 days. His rash was preceded by pain for an additional 2 days. Denies fever, chills, swelling, rash in other areas, N/V, or sick contacts. Patient is concerned about his 5 month old child whom he recently held without a shirt on.

## 2021-09-09 NOTE — ED PROVIDER NOTE - CLINICAL SUMMARY MEDICAL DECISION MAKING FREE TEXT BOX
35 y/o M presents to ED with unilateral shingles rash that does not cross midline.  Pts last chemo treatment was approx 6 months ago.  Pt is no longer immunocompromised.  VSS, afebrile. NAD.  Pt has pediatric appt scheduled for his daughter.      Results and diagnosis discussed with patient.  Treatment plan discussed: valacyclovir, prednisone and pain control.  Return precautions discussed.  Pt verbalized understanding and given the opportunity to ask questions.  Patient advised to follow up with primary care provider.

## 2021-09-09 NOTE — ED PROVIDER NOTE - NSICDXPASTMEDICALHX_GEN_ALL_CORE_FT
PAST MEDICAL HISTORY:  Malignant neoplasm of lung, unspecified laterality, unspecified part of lung     Neck malignant neoplasm       Non-Hodgkin's lymphoma

## 2021-09-09 NOTE — ED PROVIDER NOTE - SKIN, MLM
Erythematous, vesicular rash following dermatomal patterns T6-T7 around right mid torso, does not cross midline.

## 2021-09-09 NOTE — ED PROVIDER NOTE - NSFOLLOWUPINSTRUCTIONS_ED_ALL_ED_FT
TAKE VALACYCLOVIR AS PRESCRIBED UNTIL COMPLETE.  TAKE PREDNISONE AS PRESCRIBED IN THE MORNING UNTIL COMPLETE.   TAKE IBUPROFEN AS NEEDED WITH FOOD FOR PAIN.  TAKE PERCOCET AS NEEDED WITH FOOD FOR SEVERE PAIN.    RETURN FOR FEVER, NAUSEA, VOMITING, RASH ON THE LEFT SIDE OF THE BODY OR RASH IN OTHER AREAS.    PLEASE FOLLOW-UP WITH YOUR PRIMARY CARE DOCTOR IN 1-2 DAY FOR FURTHER EVALUATION.  PLEASE TAKE ALL PAPERWORK FROM TODAY'S VISIT TO YOUR PRIMARY DOCTOR.  IF YOU DO NOT HAVE A PRIMARY CARE DOCTOR PLEASE REFER TO THE OFFICE/CLINIC INFORMATION GIVEN ABOVE.  YOU MAY ALSO CALL [phone] AND ASK FOR MS. ALEC SÁNCHEZ.  SHE CAN HELP YOU MAKE A FOLLOW-UP APPOINTMENT.  HER HOURS ARE 11AM-7PM MONDAY - FRIDAY.

## 2021-09-09 NOTE — ED PROVIDER NOTE - PATIENT PORTAL LINK FT
You can access the FollowMyHealth Patient Portal offered by [facility] by registering at the following website: [email or web address]. By joining [employer]’s FollowMyHealth portal, you will also be able to view your health information using other applications (apps) compatible with our system.

## 2021-11-01 PROBLEM — C85.90 NON-HODGKIN LYMPHOMA, UNSPECIFIED, UNSPECIFIED SITE: Chronic | Status: ACTIVE | Noted: 2021-09-09

## 2021-12-02 ENCOUNTER — APPOINTMENT (OUTPATIENT)
Dept: FAMILY MEDICINE | Facility: CLINIC | Age: 36
End: 2021-12-02
Payer: COMMERCIAL

## 2021-12-02 VITALS
WEIGHT: 191 LBS | SYSTOLIC BLOOD PRESSURE: 129 MMHG | OXYGEN SATURATION: 97 % | HEART RATE: 84 BPM | HEIGHT: 71 IN | DIASTOLIC BLOOD PRESSURE: 89 MMHG | BODY MASS INDEX: 26.74 KG/M2 | TEMPERATURE: 97.2 F

## 2021-12-02 PROCEDURE — 99385 PREV VISIT NEW AGE 18-39: CPT | Mod: 25

## 2021-12-02 PROCEDURE — 99406 BEHAV CHNG SMOKING 3-10 MIN: CPT

## 2021-12-02 PROCEDURE — G0442 ANNUAL ALCOHOL SCREEN 15 MIN: CPT

## 2021-12-02 PROCEDURE — 36415 COLL VENOUS BLD VENIPUNCTURE: CPT

## 2021-12-03 NOTE — COUNSELING
[Behavioral health counseling provided] : Behavioral health counseling provided [Risk of tobacco use and health benefits of smoking cessation discussed] : Risk of tobacco use and health benefits of smoking cessation discussed [Cessation strategies including cessation program discussed] : Cessation strategies including cessation program discussed [Use of nicotine replacement therapies and other medications discussed] : Use of nicotine replacement therapies and other medications discussed [Encouraged to pick a quit date and identify support needed to quit] : Encouraged to pick a quit date and identify support needed to quit [Tobacco Use Cessation Intermediate Greater Than 3 Minutes Up to 10 Minutes] : Tobacco Use Cessation Intermediate Greater Than 3 Minutes Up to 10 Minutes [FreeTextEntry1] : 7

## 2021-12-03 NOTE — HISTORY OF PRESENT ILLNESS
[FreeTextEntry1] : establish care  [de-identified] : 37 yo m presents to establish care for cpe. \par No complaints today. \par Had CT with [facility] recently for nonhodgkins lymphoma. \par S/p 6 months chemo, in remission. \par Fully vaccinated for covid. \par

## 2021-12-03 NOTE — HEALTH RISK ASSESSMENT
[Good] : ~his/her~  mood as  good [Yes] : Yes [2 - 3 times a week (3 pts)] : 2 - 3  times a week (3 points) [1 or 2 (0 pts)] : 1 or 2 (0 points) [Never (0 pts)] : Never (0 points) [No] : In the past 12 months have you used drugs other than those required for medical reasons? No [0] : 2) Feeling down, depressed, or hopeless: Not at all (0) [PHQ-2 Negative - No further assessment needed] : PHQ-2 Negative - No further assessment needed [HIV test declined] : HIV test declined [Hepatitis C test declined] : Hepatitis C test declined [None] : None [With Significant Other] : lives with significant other [Employed] : employed [Significant Other] : lives with significant other [Sexually Active] : sexually active [Feels Safe at Home] : Feels safe at home [Fully functional (bathing, dressing, toileting, transferring, walking, feeding)] : Fully functional (bathing, dressing, toileting, transferring, walking, feeding) [Fully functional (using the telephone, shopping, preparing meals, housekeeping, doing laundry, using] : Fully functional and needs no help or supervision to perform IADLs (using the telephone, shopping, preparing meals, housekeeping, doing laundry, using transportation, managing medications and managing finances) [] : No [Audit-CScore] : 3 [de-identified] : walking [de-identified] : fruits, veggies  [WEX3Miews] : 0 [Change in mental status noted] : No change in mental status noted [Language] : denies difficulty with language [High Risk Behavior] : no high risk behavior [Reports changes in hearing] : Reports no changes in hearing [Reports changes in vision] : Reports no changes in vision

## 2021-12-08 ENCOUNTER — TRANSCRIPTION ENCOUNTER (OUTPATIENT)
Age: 36
End: 2021-12-08

## 2021-12-08 LAB
25(OH)D3 SERPL-MCNC: 22.5 NG/ML
ALBUMIN SERPL ELPH-MCNC: 4.9 G/DL
ALP BLD-CCNC: 97 U/L
ALT SERPL-CCNC: 22 U/L
ANION GAP SERPL CALC-SCNC: 14 MMOL/L
AST SERPL-CCNC: 22 U/L
BASOPHILS # BLD AUTO: 0.04 K/UL
BASOPHILS NFR BLD AUTO: 1 %
BILIRUB SERPL-MCNC: 0.4 MG/DL
BUN SERPL-MCNC: 22 MG/DL
CALCIUM SERPL-MCNC: 10.3 MG/DL
CHLORIDE SERPL-SCNC: 105 MMOL/L
CHOLEST SERPL-MCNC: 307 MG/DL
CO2 SERPL-SCNC: 22 MMOL/L
CREAT SERPL-MCNC: 1.2 MG/DL
EOSINOPHIL # BLD AUTO: 0.29 K/UL
EOSINOPHIL NFR BLD AUTO: 6.9 %
ESTIMATED AVERAGE GLUCOSE: 103 MG/DL
GLUCOSE SERPL-MCNC: 99 MG/DL
HBA1C MFR BLD HPLC: 5.2 %
HCT VFR BLD CALC: 48 %
HDLC SERPL-MCNC: 60 MG/DL
HGB BLD-MCNC: 14.9 G/DL
IMM GRANULOCYTES NFR BLD AUTO: 0.7 %
LDLC SERPL CALC-MCNC: 226 MG/DL
LYMPHOCYTES # BLD AUTO: 1.11 K/UL
LYMPHOCYTES NFR BLD AUTO: 26.4 %
MAN DIFF?: NORMAL
MCHC RBC-ENTMCNC: 31 GM/DL
MCHC RBC-ENTMCNC: 31.4 PG
MCV RBC AUTO: 101.1 FL
MONOCYTES # BLD AUTO: 0.54 K/UL
MONOCYTES NFR BLD AUTO: 12.9 %
NEUTROPHILS # BLD AUTO: 2.19 K/UL
NEUTROPHILS NFR BLD AUTO: 52.1 %
NONHDLC SERPL-MCNC: 246 MG/DL
PLATELET # BLD AUTO: 169 K/UL
POTASSIUM SERPL-SCNC: 5.3 MMOL/L
PROT SERPL-MCNC: 6.8 G/DL
RBC # BLD: 4.75 M/UL
RBC # FLD: 13.8 %
SODIUM SERPL-SCNC: 142 MMOL/L
TRIGL SERPL-MCNC: 105 MG/DL
TSH SERPL-ACNC: 2.77 UIU/ML
WBC # FLD AUTO: 4.2 K/UL

## 2022-06-16 ENCOUNTER — APPOINTMENT (OUTPATIENT)
Dept: FAMILY MEDICINE | Facility: CLINIC | Age: 37
End: 2022-06-16

## 2022-06-16 VITALS
SYSTOLIC BLOOD PRESSURE: 115 MMHG | TEMPERATURE: 94.3 F | WEIGHT: 195 LBS | BODY MASS INDEX: 27.3 KG/M2 | HEIGHT: 71 IN | HEART RATE: 83 BPM | DIASTOLIC BLOOD PRESSURE: 77 MMHG | OXYGEN SATURATION: 98 %

## 2022-10-05 ENCOUNTER — EMERGENCY (EMERGENCY)
Facility: HOSPITAL | Age: 37
LOS: 1 days | Discharge: HOME | End: 2022-10-05
Attending: EMERGENCY MEDICINE | Admitting: EMERGENCY MEDICINE

## 2022-10-05 VITALS
HEART RATE: 75 BPM | OXYGEN SATURATION: 98 % | RESPIRATION RATE: 18 BRPM | HEIGHT: 72 IN | TEMPERATURE: 98 F | WEIGHT: 175.05 LBS | DIASTOLIC BLOOD PRESSURE: 82 MMHG | SYSTOLIC BLOOD PRESSURE: 122 MMHG

## 2022-10-05 VITALS
SYSTOLIC BLOOD PRESSURE: 124 MMHG | TEMPERATURE: 98 F | DIASTOLIC BLOOD PRESSURE: 85 MMHG | HEART RATE: 65 BPM | RESPIRATION RATE: 16 BRPM | OXYGEN SATURATION: 97 %

## 2022-10-05 DIAGNOSIS — Z90.89 ACQUIRED ABSENCE OF OTHER ORGANS: Chronic | ICD-10-CM

## 2022-10-05 LAB
ALBUMIN SERPL ELPH-MCNC: 3.9 G/DL — SIGNIFICANT CHANGE UP (ref 3.4–5)
ALP SERPL-CCNC: 91 U/L — SIGNIFICANT CHANGE UP (ref 40–120)
ALT FLD-CCNC: 32 U/L — SIGNIFICANT CHANGE UP (ref 12–42)
AMYLASE P1 CFR SERPL: 85 U/L — SIGNIFICANT CHANGE UP (ref 25–115)
ANION GAP SERPL CALC-SCNC: 5 MMOL/L — LOW (ref 9–16)
APPEARANCE UR: CLEAR — SIGNIFICANT CHANGE UP
APTT BLD: 32.5 SEC — SIGNIFICANT CHANGE UP (ref 27.5–35.5)
AST SERPL-CCNC: 27 U/L — SIGNIFICANT CHANGE UP (ref 15–37)
BILIRUB SERPL-MCNC: 0.4 MG/DL — SIGNIFICANT CHANGE UP (ref 0.2–1.2)
BILIRUB UR-MCNC: NEGATIVE — SIGNIFICANT CHANGE UP
BUN SERPL-MCNC: 19 MG/DL — SIGNIFICANT CHANGE UP (ref 7–23)
CALCIUM SERPL-MCNC: 9.4 MG/DL — SIGNIFICANT CHANGE UP (ref 8.5–10.5)
CHLORIDE SERPL-SCNC: 104 MMOL/L — SIGNIFICANT CHANGE UP (ref 96–108)
CK SERPL-CCNC: 143 U/L — SIGNIFICANT CHANGE UP (ref 39–308)
CO2 SERPL-SCNC: 29 MMOL/L — SIGNIFICANT CHANGE UP (ref 22–31)
COLOR SPEC: YELLOW — SIGNIFICANT CHANGE UP
CREAT SERPL-MCNC: 1.18 MG/DL — SIGNIFICANT CHANGE UP (ref 0.5–1.3)
D DIMER BLD IA.RAPID-MCNC: <187 NG/ML DDU — SIGNIFICANT CHANGE UP
DIFF PNL FLD: NEGATIVE — SIGNIFICANT CHANGE UP
EGFR: 80 ML/MIN/1.73M2 — SIGNIFICANT CHANGE UP
GLUCOSE SERPL-MCNC: 99 MG/DL — SIGNIFICANT CHANGE UP (ref 70–99)
GLUCOSE UR QL: NEGATIVE — SIGNIFICANT CHANGE UP
HCT VFR BLD CALC: 44.9 % — SIGNIFICANT CHANGE UP (ref 39–50)
HGB BLD-MCNC: 15.2 G/DL — SIGNIFICANT CHANGE UP (ref 13–17)
HIV 1 & 2 AB SERPL IA.RAPID: SIGNIFICANT CHANGE UP
INR BLD: 0.93 — SIGNIFICANT CHANGE UP (ref 0.88–1.16)
KETONES UR-MCNC: NEGATIVE — SIGNIFICANT CHANGE UP
LEUKOCYTE ESTERASE UR-ACNC: NEGATIVE — SIGNIFICANT CHANGE UP
LIDOCAIN IGE QN: 90 U/L — SIGNIFICANT CHANGE UP (ref 73–393)
MCHC RBC-ENTMCNC: 31.1 PG — SIGNIFICANT CHANGE UP (ref 27–34)
MCHC RBC-ENTMCNC: 33.9 GM/DL — SIGNIFICANT CHANGE UP (ref 32–36)
MCV RBC AUTO: 92 FL — SIGNIFICANT CHANGE UP (ref 80–100)
NITRITE UR-MCNC: NEGATIVE — SIGNIFICANT CHANGE UP
NRBC # BLD: 0 /100 WBCS — SIGNIFICANT CHANGE UP (ref 0–0)
NT-PROBNP SERPL-SCNC: 34 PG/ML — SIGNIFICANT CHANGE UP
PH UR: 6.5 — SIGNIFICANT CHANGE UP (ref 5–8)
PLATELET # BLD AUTO: 171 K/UL — SIGNIFICANT CHANGE UP (ref 150–400)
POTASSIUM SERPL-MCNC: 4.5 MMOL/L — SIGNIFICANT CHANGE UP (ref 3.5–5.3)
POTASSIUM SERPL-SCNC: 4.5 MMOL/L — SIGNIFICANT CHANGE UP (ref 3.5–5.3)
PROT SERPL-MCNC: 7.4 G/DL — SIGNIFICANT CHANGE UP (ref 6.4–8.2)
PROT UR-MCNC: NEGATIVE MG/DL — SIGNIFICANT CHANGE UP
PROTHROM AB SERPL-ACNC: 10.8 SEC — SIGNIFICANT CHANGE UP (ref 10.5–13.4)
RBC # BLD: 4.88 M/UL — SIGNIFICANT CHANGE UP (ref 4.2–5.8)
RBC # FLD: 13.2 % — SIGNIFICANT CHANGE UP (ref 10.3–14.5)
SARS-COV-2 RNA SPEC QL NAA+PROBE: SIGNIFICANT CHANGE UP
SODIUM SERPL-SCNC: 138 MMOL/L — SIGNIFICANT CHANGE UP (ref 132–145)
SP GR SPEC: 1.01 — SIGNIFICANT CHANGE UP (ref 1–1.03)
TROPONIN I, HIGH SENSITIVITY RESULT: 7.6 NG/L — SIGNIFICANT CHANGE UP
UROBILINOGEN FLD QL: 0.2 E.U./DL — SIGNIFICANT CHANGE UP
WBC # BLD: 5.81 K/UL — SIGNIFICANT CHANGE UP (ref 3.8–10.5)
WBC # FLD AUTO: 5.81 K/UL — SIGNIFICANT CHANGE UP (ref 3.8–10.5)

## 2022-10-05 PROCEDURE — 74177 CT ABD & PELVIS W/CONTRAST: CPT | Mod: 26

## 2022-10-05 PROCEDURE — 71275 CT ANGIOGRAPHY CHEST: CPT | Mod: 26

## 2022-10-05 PROCEDURE — 99285 EMERGENCY DEPT VISIT HI MDM: CPT

## 2022-10-05 RX ORDER — DIATRIZOATE MEGLUMINE 180 MG/ML
30 INJECTION, SOLUTION INTRAVESICAL ONCE
Refills: 0 | Status: COMPLETED | OUTPATIENT
Start: 2022-10-05 | End: 2022-10-05

## 2022-10-05 RX ADMIN — DIATRIZOATE MEGLUMINE 30 MILLILITER(S): 180 INJECTION, SOLUTION INTRAVESICAL at 10:30

## 2022-10-05 NOTE — ED PROVIDER NOTE - PATIENT PORTAL LINK FT
You can access the FollowMyHealth Patient Portal offered by [facility] by registering at the following website: [email or web address]. By joining [employer]’s FollowMyHealth portal, you will also be able to view your health information using other applications (apps) compatible with our system.

## 2022-10-05 NOTE — ED PROVIDER NOTE - CLINICAL SUMMARY MEDICAL DECISION MAKING FREE TEXT BOX
36 y/o M with PMHx of Non-Hodgkin's lymphoma (chemotherapy 2 years ago, in remission for 1.5 years) presents complaining of LUQ pain tenderness since yesterday. On PE, LUQ noted. Will CT abdomen. 36 y/o M with PMHx of Non-Hodgkin's lymphoma (chemotherapy 2 years ago, in remission for 1.5 years) presents complaining of LUQ pain tenderness since yesterday. On PE, LUQ noted.     CT chest abd pelvis with IV contrast shows mediastinal adenopathy suggestive of recurrence of lymphoma.

## 2022-10-05 NOTE — ED ADULT TRIAGE NOTE - SPO2 (%)
What Type Of Note Output Would You Prefer (Optional)?: Standard Output Is The Patient Presenting As Previously Scheduled?: Yes How Severe Is Your Rash?: mild Is This A New Presentation, Or A Follow-Up?: Rash 98

## 2022-10-05 NOTE — ED PROVIDER NOTE - NSFOLLOWUPINSTRUCTIONS_ED_ALL_ED_FT
Please contact your team at [facility] today and show them the CAT scan results.  Please return at any time if you have any concerns.  Your results are attached.

## 2022-10-05 NOTE — ED ADULT NURSE NOTE - OBJECTIVE STATEMENT
Patient presents with constant left upper quadrant abdominal pain, under left chest. Pain worsened with deep breathing. Denies nausea, vomiting, diarrhea, shortness of breath, dizziness, weakness, fever, chill.

## 2022-10-05 NOTE — ED PROVIDER NOTE - OBJECTIVE STATEMENT
38 y/o M with PMHx of Non-Hodgkin's lymphoma (chemotherapy 2 years ago, in remission for 1.5 years) presents complaining of LUQ pain since yesterday. The pain is described as "excruciating." Pt states that the pain began immediately after a meal of pizza and chicken wings.

## 2022-10-05 NOTE — SBIRT NOTE ADULT - NSSBIRTDRGBRIEFINTDET_GEN_A_CORE
Provided SBIRT services:  Full Screen Positive. Brief Intervention Performed.    Screening results were reviewed with the patient and patient was provided information about healthy guidelines and potential negative consequences associated with level of risk. Motivation and readiness to reduce or stop use was discussed and goals and activities to make changes were suggested/offered.      AUDIT Score: 8  DAST-10 Score: 2  Duration = 15 Minutes

## 2022-10-07 DIAGNOSIS — R10.12 LEFT UPPER QUADRANT PAIN: ICD-10-CM

## 2022-10-07 DIAGNOSIS — C79.89 SECONDARY MALIGNANT NEOPLASM OF OTHER SPECIFIED SITES: ICD-10-CM

## 2022-10-07 DIAGNOSIS — J98.59 OTHER DISEASES OF MEDIASTINUM, NOT ELSEWHERE CLASSIFIED: ICD-10-CM

## 2022-10-07 DIAGNOSIS — C85.90 NON-HODGKIN LYMPHOMA, UNSPECIFIED, UNSPECIFIED SITE: ICD-10-CM

## 2022-10-07 DIAGNOSIS — C34.90 MALIGNANT NEOPLASM OF UNSPECIFIED PART OF UNSPECIFIED BRONCHUS OR LUNG: ICD-10-CM

## 2022-10-07 DIAGNOSIS — Z20.822 CONTACT WITH AND (SUSPECTED) EXPOSURE TO COVID-19: ICD-10-CM

## 2023-04-21 ENCOUNTER — APPOINTMENT (OUTPATIENT)
Dept: FAMILY MEDICINE | Facility: CLINIC | Age: 38
End: 2023-04-21
Payer: COMMERCIAL

## 2023-04-21 VITALS
HEIGHT: 72 IN | WEIGHT: 199 LBS | TEMPERATURE: 98.3 F | SYSTOLIC BLOOD PRESSURE: 131 MMHG | BODY MASS INDEX: 26.95 KG/M2 | DIASTOLIC BLOOD PRESSURE: 84 MMHG | HEART RATE: 89 BPM | OXYGEN SATURATION: 96 %

## 2023-04-21 DIAGNOSIS — Z00.00 ENCOUNTER FOR GENERAL ADULT MEDICAL EXAMINATION W/OUT ABNORMAL FINDINGS: ICD-10-CM

## 2023-04-21 DIAGNOSIS — Z85.72 PERSONAL HISTORY OF NON-HODGKIN LYMPHOMAS: ICD-10-CM

## 2023-04-21 PROCEDURE — 36415 COLL VENOUS BLD VENIPUNCTURE: CPT

## 2023-04-21 PROCEDURE — 99406 BEHAV CHNG SMOKING 3-10 MIN: CPT

## 2023-04-21 PROCEDURE — 99396 PREV VISIT EST AGE 40-64: CPT | Mod: 25

## 2023-04-21 NOTE — HISTORY OF PRESENT ILLNESS
[FreeTextEntry1] : annual  [de-identified] : 37 yo m presents for annual. \par No complaints today. \par

## 2023-04-21 NOTE — HEALTH RISK ASSESSMENT
[Good] : ~his/her~  mood as  good [Yes] : Yes [2 - 3 times a week (3 pts)] : 2 - 3  times a week (3 points) [3 or 4 (1 pt)] : 3 or 4  (1 point) [Never (0 pts)] : Never (0 points) [No] : In the past 12 months have you used drugs other than those required for medical reasons? No [0] : 2) Feeling down, depressed, or hopeless: Not at all (0) [PHQ-2 Negative - No further assessment needed] : PHQ-2 Negative - No further assessment needed [Audit-CScore] : 4 [de-identified] : walking  [de-identified] : fruits, veggies  [CPV6Niatn] : 0 [HIV test declined] : HIV test declined [Hepatitis C test declined] : Hepatitis C test declined [Change in mental status noted] : No change in mental status noted [Language] : denies difficulty with language [None] : None [With Family] : lives with family [Employed] : employed [] :  [# Of Children ___] : has [unfilled] children [Sexually Active] : sexually active [Feels Safe at Home] : Feels safe at home [Fully functional (bathing, dressing, toileting, transferring, walking, feeding)] : Fully functional (bathing, dressing, toileting, transferring, walking, feeding) [Fully functional (using the telephone, shopping, preparing meals, housekeeping, doing laundry, using] : Fully functional and needs no help or supervision to perform IADLs (using the telephone, shopping, preparing meals, housekeeping, doing laundry, using transportation, managing medications and managing finances) [Reports changes in hearing] : Reports no changes in hearing [Reports changes in vision] : Reports no changes in vision [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

## 2023-04-21 NOTE — COUNSELING
[Behavioral health counseling provided] : Behavioral health counseling provided [Cessation strategies including cessation program discussed] : Cessation strategies including cessation program discussed [Use of nicotine replacement therapies and other medications discussed] : Use of nicotine replacement therapies and other medications discussed [Encouraged to pick a quit date and identify support needed to quit] : Encouraged to pick a quit date and identify support needed to quit [Smoking Cessation Program Referral] : Smoking Cessation Program Referral  [FreeTextEntry1] : 5 [AUDIT-C Screening administered and reviewed] : AUDIT-C Screening administered and reviewed [Hazards of at-risk alcohol use discussed] : Hazards of at-risk alcohol use discussed [Strategies to reduce or eliminate alcohol use discussed] : Strategies to reduce or eliminate alcohol use discussed [Support options provided] : Support options provided

## 2023-04-24 LAB
25(OH)D3 SERPL-MCNC: 29 NG/ML
ALBUMIN SERPL ELPH-MCNC: 4.9 G/DL
ALP BLD-CCNC: 103 U/L
ALT SERPL-CCNC: 25 U/L
ANION GAP SERPL CALC-SCNC: 14 MMOL/L
AST SERPL-CCNC: 23 U/L
BASOPHILS # BLD AUTO: 0.08 K/UL
BASOPHILS NFR BLD AUTO: 1.2 %
BILIRUB SERPL-MCNC: 0.3 MG/DL
BUN SERPL-MCNC: 19 MG/DL
CALCIUM SERPL-MCNC: 10.2 MG/DL
CHLORIDE SERPL-SCNC: 104 MMOL/L
CHOLEST SERPL-MCNC: 284 MG/DL
CO2 SERPL-SCNC: 22 MMOL/L
CREAT SERPL-MCNC: 1.22 MG/DL
EGFR: 77 ML/MIN/1.73M2
EOSINOPHIL # BLD AUTO: 0.33 K/UL
EOSINOPHIL NFR BLD AUTO: 4.9 %
ESTIMATED AVERAGE GLUCOSE: 114 MG/DL
GLUCOSE SERPL-MCNC: 94 MG/DL
HBA1C MFR BLD HPLC: 5.6 %
HCT VFR BLD CALC: 48.9 %
HDLC SERPL-MCNC: 50 MG/DL
HGB BLD-MCNC: 15.3 G/DL
IMM GRANULOCYTES NFR BLD AUTO: 0.7 %
LDLC SERPL CALC-MCNC: 177 MG/DL
LYMPHOCYTES # BLD AUTO: 1.63 K/UL
LYMPHOCYTES NFR BLD AUTO: 24 %
MAN DIFF?: NORMAL
MCHC RBC-ENTMCNC: 30.4 PG
MCHC RBC-ENTMCNC: 31.3 GM/DL
MCV RBC AUTO: 97 FL
MONOCYTES # BLD AUTO: 0.7 K/UL
MONOCYTES NFR BLD AUTO: 10.3 %
NEUTROPHILS # BLD AUTO: 4.01 K/UL
NEUTROPHILS NFR BLD AUTO: 58.9 %
NONHDLC SERPL-MCNC: 233 MG/DL
PLATELET # BLD AUTO: 204 K/UL
POTASSIUM SERPL-SCNC: 4.9 MMOL/L
PROT SERPL-MCNC: 7.3 G/DL
PSA SERPL-MCNC: 0.66 NG/ML
RBC # BLD: 5.04 M/UL
RBC # FLD: 14 %
SODIUM SERPL-SCNC: 140 MMOL/L
TRIGL SERPL-MCNC: 282 MG/DL
TSH SERPL-ACNC: 1.67 UIU/ML
WBC # FLD AUTO: 6.8 K/UL

## 2023-04-25 ENCOUNTER — NON-APPOINTMENT (OUTPATIENT)
Age: 38
End: 2023-04-25

## 2023-10-06 ENCOUNTER — APPOINTMENT (OUTPATIENT)
Dept: FAMILY MEDICINE | Facility: CLINIC | Age: 38
End: 2023-10-06
Payer: COMMERCIAL

## 2023-10-06 VITALS
TEMPERATURE: 98.2 F | HEIGHT: 72 IN | SYSTOLIC BLOOD PRESSURE: 123 MMHG | DIASTOLIC BLOOD PRESSURE: 80 MMHG | OXYGEN SATURATION: 96 % | HEART RATE: 91 BPM | BODY MASS INDEX: 27.53 KG/M2 | WEIGHT: 203.25 LBS

## 2023-10-06 DIAGNOSIS — R05.9 COUGH, UNSPECIFIED: ICD-10-CM

## 2023-10-06 PROCEDURE — 36415 COLL VENOUS BLD VENIPUNCTURE: CPT

## 2023-10-06 PROCEDURE — 99214 OFFICE O/P EST MOD 30 MIN: CPT | Mod: 25

## 2023-10-06 RX ORDER — PROMETHAZINE HYDROCHLORIDE 6.25 MG/5ML
6.25 SOLUTION ORAL
Qty: 150 | Refills: 0 | Status: ACTIVE | COMMUNITY
Start: 2023-10-06 | End: 1900-01-01

## 2023-10-06 RX ORDER — FLUTICASONE PROPIONATE 50 UG/1
50 SPRAY, METERED NASAL
Qty: 1 | Refills: 2 | Status: ACTIVE | COMMUNITY
Start: 2023-10-06 | End: 1900-01-01

## 2023-10-12 LAB
ALBUMIN SERPL ELPH-MCNC: 4.8 G/DL
ALP BLD-CCNC: 101 U/L
ALT SERPL-CCNC: 22 U/L
ANION GAP SERPL CALC-SCNC: 11 MMOL/L
AST SERPL-CCNC: 21 U/L
BILIRUB SERPL-MCNC: 0.2 MG/DL
BUN SERPL-MCNC: 23 MG/DL
CALCIUM SERPL-MCNC: 9.6 MG/DL
CHLORIDE SERPL-SCNC: 103 MMOL/L
CHOLEST SERPL-MCNC: 290 MG/DL
CO2 SERPL-SCNC: 24 MMOL/L
CREAT SERPL-MCNC: 1.15 MG/DL
EGFR: 83 ML/MIN/1.73M2
ESTIMATED AVERAGE GLUCOSE: 117 MG/DL
GLUCOSE SERPL-MCNC: 103 MG/DL
HBA1C MFR BLD HPLC: 5.7 %
HDLC SERPL-MCNC: 45 MG/DL
LDLC SERPL CALC-MCNC: 188 MG/DL
NONHDLC SERPL-MCNC: 245 MG/DL
POTASSIUM SERPL-SCNC: 4.9 MMOL/L
PROT SERPL-MCNC: 6.9 G/DL
SODIUM SERPL-SCNC: 138 MMOL/L
TRIGL SERPL-MCNC: 289 MG/DL

## 2023-10-16 RX ORDER — ATORVASTATIN CALCIUM 20 MG/1
20 TABLET, FILM COATED ORAL
Qty: 90 | Refills: 0 | Status: ACTIVE | COMMUNITY
Start: 2023-10-16 | End: 1900-01-01

## 2023-10-17 ENCOUNTER — TRANSCRIPTION ENCOUNTER (OUTPATIENT)
Age: 38
End: 2023-10-17

## 2023-11-11 PROBLEM — Z85.89 HISTORY OF MALIGNANT NEOPLASM OF NECK: Status: RESOLVED | Noted: 2020-10-20 | Resolved: 2023-04-21

## 2023-11-11 PROBLEM — Z78.9 NO PERTINENT PAST MEDICAL HISTORY: Status: RESOLVED | Noted: 2020-10-21 | Resolved: 2023-11-11

## 2023-11-11 PROBLEM — R49.0 DYSPHONIA: Status: RESOLVED | Noted: 2020-11-02 | Resolved: 2021-12-02

## 2023-11-11 PROBLEM — Z85.118 HISTORY OF MALIGNANT NEOPLASM OF LUNG: Status: RESOLVED | Noted: 2020-10-20 | Resolved: 2023-04-21

## 2023-11-11 PROBLEM — Z01.818 PREOP EXAMINATION: Status: RESOLVED | Noted: 2020-10-21 | Resolved: 2021-12-02

## 2023-11-11 PROBLEM — E78.00 HIGH BLOOD CHOLESTEROL: Noted: 2023-10-06

## 2023-11-11 PROBLEM — Z87.898 HISTORY OF DYSPHAGIA: Status: RESOLVED | Noted: 2020-11-02 | Resolved: 2021-12-02

## 2023-11-16 ENCOUNTER — APPOINTMENT (OUTPATIENT)
Dept: HEART AND VASCULAR | Facility: CLINIC | Age: 38
End: 2023-11-16
Payer: COMMERCIAL

## 2023-11-16 ENCOUNTER — NON-APPOINTMENT (OUTPATIENT)
Age: 38
End: 2023-11-16

## 2023-11-16 VITALS
DIASTOLIC BLOOD PRESSURE: 82 MMHG | SYSTOLIC BLOOD PRESSURE: 121 MMHG | BODY MASS INDEX: 28.23 KG/M2 | HEIGHT: 72 IN | HEART RATE: 72 BPM | WEIGHT: 208.44 LBS | OXYGEN SATURATION: 97 %

## 2023-11-16 DIAGNOSIS — E78.00 PURE HYPERCHOLESTEROLEMIA, UNSPECIFIED: ICD-10-CM

## 2023-11-16 DIAGNOSIS — Z87.898 PERSONAL HISTORY OF OTHER SPECIFIED CONDITIONS: ICD-10-CM

## 2023-11-16 DIAGNOSIS — Z78.9 OTHER SPECIFIED HEALTH STATUS: ICD-10-CM

## 2023-11-16 DIAGNOSIS — Z01.818 ENCOUNTER FOR OTHER PREPROCEDURAL EXAMINATION: ICD-10-CM

## 2023-11-16 DIAGNOSIS — R49.0 DYSPHONIA: ICD-10-CM

## 2023-11-16 DIAGNOSIS — Z85.89 PERSONAL HISTORY OF MALIGNANT NEOPLASM OF OTHER ORGANS AND SYSTEMS: ICD-10-CM

## 2023-11-16 DIAGNOSIS — Z85.118 PERSONAL HISTORY OF OTHER MALIGNANT NEOPLASM OF BRONCHUS AND LUNG: ICD-10-CM

## 2023-11-16 PROCEDURE — 99205 OFFICE O/P NEW HI 60 MIN: CPT | Mod: 25

## 2023-11-16 PROCEDURE — 93000 ELECTROCARDIOGRAM COMPLETE: CPT

## 2023-12-07 ENCOUNTER — APPOINTMENT (OUTPATIENT)
Dept: FAMILY MEDICINE | Facility: CLINIC | Age: 38
End: 2023-12-07

## 2023-12-11 ENCOUNTER — APPOINTMENT (OUTPATIENT)
Dept: SLEEP CENTER | Facility: HOME HEALTH | Age: 38
End: 2023-12-11

## 2024-02-02 NOTE — PATIENT PROFILE ADULT - LAST TOBACCO USE (DD-MM-YY)
INPATIENT PROGRESS NOTE   Diabetes & Endocrine Care           Sung Huber is a 68 year old male at [facility]       Assessment:   Overall condition: unchanged  Hypoglycemia likely secondary to fingerstick  Basic workup is negative      Plan:   Observation improved intake  Check Sugar in the blood or earlobe if possible    We will continue to monitor with you  LABS: BMP      Subjective:   Interval History:  No major changes    Appearing sick    Principal Problem:    Polyneuropathy  Active Problems:    Essential (primary) hypertension    Gastroesophageal reflux disease    High cholesterol    Hyperplasia of prostate    Alcohol abuse    Rhabdomyolysis    Weakness of right leg    Left arm weakness      Review of Systems     Current Meds:  Current Facility-Administered Medications   Medication Dose Route Frequency Provider Last Rate Last Admin    docusate sodium-sennosides (SENOKOT S) 50-8.6 MG 1 tablet  1 tablet Oral Nightly Cheko Mcadams MD   1 tablet at 01/31/24 2048    polyethylene glycol (MIRALAX) packet 17 g  17 g Oral Daily PRN Cheko Mcadams MD   17 g at 02/01/24 0819    loperamide (IMODIUM) capsule 2 mg  2 mg Oral PRN Prashant Leavitt MD   2 mg at 01/29/24 0601    amLODIPine (NORVASC) tablet 5 mg  5 mg Oral Daily Prashant Leavitt MD   5 mg at 02/02/24 0905    nicotine (NICODERM) 21 MG/24HR patch 1 patch  1 patch Transdermal Daily Prashant Leavitt MD   1 patch at 02/02/24 0910    pantoprazole (PROTONIX) EC tablet 40 mg  40 mg Oral QAM AC Prashant Leavitt MD   40 mg at 02/02/24 0504    sodium chloride 0.9 % flush bag 25 mL  25 mL Intravenous PRN Prashant Leavitt MD        sodium chloride (NORMAL SALINE) 0.9 % bolus 500 mL  500 mL Intravenous PRN Prashant Leavitt MD        cholecalciferol (VITAMIN D) tablet 25 mcg  25 mcg Oral Daily Prashant Leavitt MD   25 mcg at 02/02/24 0905    aspirin (ECOTRIN) enteric coated tablet 81 mg  81 mg Oral Daily Prashant Leavitt  MD KELLY   81 mg at 02/02/24 0905    traZODone (DESYREL) tablet 100 mg  100 mg Oral Nightly Prashant Leavitt MD   100 mg at 02/01/24 2051    dextrose 50 % injection 25 g  25 g Intravenous PRN Prashant Leavitt MD        dextrose 50 % injection 12.5 g  12.5 g Intravenous PRN Prashant Leavitt MD        glucagon (GLUCAGEN) injection 1 mg  1 mg Intramuscular PRN Prashant Leavitt MD        dextrose (GLUTOSE) 40 % gel 15 g  15 g Oral PRN Prashant Leavitt MD        dextrose (GLUTOSE) 40 % gel 30 g  30 g Oral PRN Prashant Leavitt MD        benzonatate (TESSALON PERLES) capsule 100 mg  100 mg Oral TID PRN Prashant Leavitt MD        folic acid (FOLATE) tablet 5 mg  5 mg Oral Daily Prashant Leavitt MD   5 mg at 02/02/24 0905    acetaminophen (TYLENOL) tablet 650 mg  650 mg Oral Q4H PRN Prashant Leavitt MD   650 mg at 01/28/24 2101    thiamine (VITAMIN B1) tablet 100 mg  100 mg Oral Daily Prashant Leavitt MD   100 mg at 02/02/24 0905    zolpidem (AMBIEN) tablet 10 mg  10 mg Oral Nightly PRN Prashant Leavitt MD   10 mg at 02/01/24 2051    melatonin tablet 3 mg  3 mg Oral Nightly PRN Cheko Mcadams MD             Objective:   Physical Exam:    Visit Vitals  BP (!) 148/82 (BP Location: LUE - Left upper extremity, Patient Position: Supine)   Pulse 99   Temp 97.5 °F (36.4 °C) (Oral)   Resp 16   Ht 5' 8\" (1.727 m)   Wt 57.1 kg (125 lb 14.1 oz)   SpO2 99%   BMI 19.14 kg/m²       Physical Exam       Heart :S1-S2  Abdomen:  Soft  Lungs:  Bilaterally fair air movement  Extremity: no clubbing cyanosis or edema  Skin: no major changes    Labs:  Recent Results (from the past 24 hour(s))   Basic Metabolic Panel    Collection Time: 02/01/24 11:15 AM   Result Value Ref Range    Fasting Status      Sodium 133 (L) 135 - 145 mmol/L    Potassium 4.4 3.4 - 5.1 mmol/L    Chloride 103 97 - 110 mmol/L    Carbon Dioxide 26 21 - 32 mmol/L    Anion Gap 8 7 - 19 mmol/L    Glucose 108 (H) 70 - 99  mg/dL    BUN 38 (H) 6 - 20 mg/dL    Creatinine 1.26 (H) 0.67 - 1.17 mg/dL    Glomerular Filtration Rate 61 >=60    BUN/Cr 30 (H) 7 - 25    Calcium 8.8 8.4 - 10.2 mg/dL   Sodium, Urine    Collection Time: 02/01/24  5:38 PM   Result Value Ref Range    Sodium, Urine 39 mmol/L   Chloride , Urine    Collection Time: 02/01/24  5:38 PM   Result Value Ref Range    Chloride, Urine 46 mmol/L   Osmolality, Urine    Collection Time: 02/01/24  5:38 PM   Result Value Ref Range    Osmolality, Urine 608 50 - 1,200 mOsm/kg   Creatinine, Urine    Collection Time: 02/01/24  5:38 PM   Result Value Ref Range    Creatinine, Urine 107.00 mg/dL     No results found for: \"GLUCOSEPOCT\"  No results found    Invalid input(s): \"T3FRE\"       Imaging  No orders to display       I&O:     Intake/Output Summary (Last 24 hours) at 2/2/2024 1019  Last data filed at 2/2/2024 0506  Gross per 24 hour   Intake --   Output 500 ml   Net -500 ml       Weights:   Wt Readings from Last 3 Encounters:   02/02/24 57.1 kg (125 lb 14.1 oz)   01/03/24 69.4 kg (153 lb)   04/20/23 75 kg (165 lb 5.5 oz)       [unfilled]     Data Review:  Labs: CBC:   Lab Results   Component Value Date    WBC 7.1 01/29/2024    RBC 2.83 (L) 01/29/2024     BMP:   Lab Results   Component Value Date    GLUCOSE 108 (H) 02/01/2024    SODIUM 133 (L) 02/01/2024    POTASSIUM 4.4 02/01/2024    CHLORIDE 103 02/01/2024    CO2 26 02/01/2024    BUN 38 (H) 02/01/2024    CREATININE 1.26 (H) 02/01/2024    CALCIUM 8.8 02/01/2024     Coagulation:   Lab Results   Component Value Date    PT 10.2 01/03/2024    INR 0.9 01/03/2024     Cardiac markers: No results found for: \"CKMB\", \"MYOGLOBIN\"  ABGs: No results found for: \"PH\", \"PO2\", \"PCO2\"        Chaz Wheeler MD  2/2/2024, 10:19 AM   05-Oct-2020

## 2024-03-11 PROBLEM — R73.03 PREDIABETES: Status: ACTIVE | Noted: 2023-11-11

## 2024-03-11 PROBLEM — R06.83 SNORING: Status: ACTIVE | Noted: 2023-11-16

## 2024-03-11 PROBLEM — E78.5 DYSLIPIDEMIA: Status: ACTIVE | Noted: 2023-11-11

## 2024-03-11 NOTE — HISTORY OF PRESENT ILLNESS
[FreeTextEntry1] : Mr. Chavez presents for follow up and management of non-Hodgkin's lymphoma (s/p chemotherapy '20), pre-DM2, and dyslipidemia. He was referred to cardiology by his PMD, Sanjeev Guajardo MD, to address his elevated LDL (188, 10/06/23).  Of note, his LDL was 226 on 12/02/21, which does raise the possibility of familial hyperlipidemia.  His PMD prescribed atorvastatin 20mg but he would like to pursue a trial of lifestyle modifications prior to initiating lipid lowering therapy.  He works as a  at a restaurant in [place] and has gained weight steadily over the past 3 years.  In addition, he screens positive for possible YOLETTE with complaints of snoring, GILES, and daytime fatigue.

## 2024-03-11 NOTE — REASON FOR VISIT
[Other: ____] : [unfilled] [FreeTextEntry1] : ======================================================================= Diagnostic Tests: -------------------------------------------------------------- EC23: sinus rhythm, normal ECG.  10/05/22: sinus rhythm, early repolarization abnormalities.   -------------------------------------------------------------- CT: 10/05/22: chest/abdomen/pelvis: no PE, multiple enlarged mediastinal lymph nodes suspicious for recurrent lymphoma.

## 2024-03-11 NOTE — ASSESSMENT
[FreeTextEntry1] : ======================================================================================= 1. Dyslipidemia:  (12/02/21), 188 (10/06/23),: ? familial hyperlipidemia:      - discussed therapeutic lifestyle changes to promote improved lipid metabolism     - will pursue a trial of therapeutic lifestyle changes and initiate lipid lowering therapy if LDL remains above goal  2. Pre-DM2: A1c 5.7% (10/06/23):    - discussed with patient therapeutic lifestyle changes to improve glucose metabolism  3. Non-Hodgkins lymphoma, s/p chemotherapy (2020):    - follow up with oncologist, [facility]  4. r/o YOLETTE: + snoring, + daytime somnolence:    - will order a home sleep study today   I spent > 45 minutes of total time on this visit, including time spent face-to-face and non-face-to-face.  During this time, I took a relevant history and examined the patient.  I reviewed relevant portions of the medical record and formulated a differential diagnosis and plan.  I explained the relevant cardiac diagnoses to the patient, as well as the work up and management plan.  I answered all questions related to the patient's cardiac conditions.

## 2024-03-11 NOTE — PHYSICAL EXAM
[Well Developed] : well developed [No Acute Distress] : no acute distress [Well Nourished] : well nourished [Normal Conjunctiva] : normal conjunctiva [Normal Venous Pressure] : normal venous pressure [Normal S1, S2] : normal S1, S2 [No Carotid Bruit] : no carotid bruit [No Murmur] : no murmur [No Rub] : no rub [No Gallop] : no gallop [Clear Lung Fields] : clear lung fields [Good Air Entry] : good air entry [No Respiratory Distress] : no respiratory distress  [Soft] : abdomen soft [Non Tender] : non-tender [No Masses/organomegaly] : no masses/organomegaly [Normal Bowel Sounds] : normal bowel sounds [Normal Gait] : normal gait [No Edema] : no edema [No Cyanosis] : no cyanosis [No Clubbing] : no clubbing [No Rash] : no rash [No Varicosities] : no varicosities [No Skin Lesions] : no skin lesions [Moves all extremities] : moves all extremities [No Focal Deficits] : no focal deficits [Alert and Oriented] : alert and oriented [Normal Speech] : normal speech [Normal memory] : normal memory

## 2024-03-14 ENCOUNTER — APPOINTMENT (OUTPATIENT)
Dept: HEART AND VASCULAR | Facility: CLINIC | Age: 39
End: 2024-03-14

## 2024-03-14 DIAGNOSIS — E78.5 HYPERLIPIDEMIA, UNSPECIFIED: ICD-10-CM

## 2024-03-14 DIAGNOSIS — R06.83 SNORING: ICD-10-CM

## 2024-03-14 DIAGNOSIS — R73.03 PREDIABETES.: ICD-10-CM

## 2024-04-03 ENCOUNTER — APPOINTMENT (OUTPATIENT)
Dept: INTERNAL MEDICINE | Facility: CLINIC | Age: 39
End: 2024-04-03
Payer: COMMERCIAL

## 2024-04-03 VITALS
OXYGEN SATURATION: 95 % | HEART RATE: 72 BPM | HEIGHT: 73 IN | TEMPERATURE: 95.7 F | DIASTOLIC BLOOD PRESSURE: 88 MMHG | BODY MASS INDEX: 27.83 KG/M2 | WEIGHT: 210 LBS | SYSTOLIC BLOOD PRESSURE: 136 MMHG

## 2024-04-03 DIAGNOSIS — H92.09 OTALGIA, UNSPECIFIED EAR: ICD-10-CM

## 2024-04-03 DIAGNOSIS — R13.10 DYSPHAGIA, UNSPECIFIED: ICD-10-CM

## 2024-04-03 DIAGNOSIS — R07.0 PAIN IN THROAT: ICD-10-CM

## 2024-04-03 DIAGNOSIS — C85.90 NON-HODGKIN LYMPHOMA, UNSPECIFIED, UNSPECIFIED SITE: ICD-10-CM

## 2024-04-03 PROCEDURE — 36415 COLL VENOUS BLD VENIPUNCTURE: CPT

## 2024-04-03 PROCEDURE — 99213 OFFICE O/P EST LOW 20 MIN: CPT | Mod: 25

## 2024-04-03 NOTE — PHYSICAL EXAM
[Well Nourished] : well nourished [No Acute Distress] : no acute distress [Well Developed] : well developed [Well-Appearing] : well-appearing [Normal Sclera/Conjunctiva] : normal sclera/conjunctiva [PERRL] : pupils equal round and reactive to light [EOMI] : extraocular movements intact [Normal Outer Ear/Nose] : the outer ears and nose were normal in appearance [Normal TMs] : both tympanic membranes were normal [Normal Oropharynx] : the oropharynx was normal [No Lymphadenopathy] : no lymphadenopathy [No JVD] : no jugular venous distention [Supple] : supple [No Respiratory Distress] : no respiratory distress  [No Accessory Muscle Use] : no accessory muscle use [Clear to Auscultation] : lungs were clear to auscultation bilaterally [Normal Rate] : normal rate  [Regular Rhythm] : with a regular rhythm [Normal S1, S2] : normal S1 and S2 [No Edema] : there was no peripheral edema [Soft] : abdomen soft [No Extremity Clubbing/Cyanosis] : no extremity clubbing/cyanosis [Normal Bowel Sounds] : normal bowel sounds [Normal Anterior Cervical Nodes] : no anterior cervical lymphadenopathy [Normal Posterior Cervical Nodes] : no posterior cervical lymphadenopathy [No CVA Tenderness] : no CVA  tenderness [No Joint Swelling] : no joint swelling [No Spinal Tenderness] : no spinal tenderness [Grossly Normal Strength/Tone] : grossly normal strength/tone [No Rash] : no rash [Coordination Grossly Intact] : coordination grossly intact [No Focal Deficits] : no focal deficits [Speech Grossly Normal] : speech grossly normal [Normal Gait] : normal gait [Normal Affect] : the affect was normal [Normal Mood] : the mood was normal [Normal Insight/Judgement] : insight and judgment were intact [Non-distended] : non-distended [No Masses] : no abdominal mass palpated [de-identified] : no tonsillar exudates  [de-identified] : no hepatosplenomegaly [de-identified] : straight leg test negative, no signs of sciatica or nerve pain on exam

## 2024-04-03 NOTE — HISTORY OF PRESENT ILLNESS
[FreeTextEntry1] : Pain under jaw  [de-identified] : Pt presents to the office with throat pain on right side under the jaw that radiates up towards the right ear. Denies fever, chills, night sweats, swollen lymph nodes, cough, rash, headaches, visual changes, chest pain, sob, palpitations, dysuria, abdominal pain, nausea, vomiting, diarrhea. Complains of pain when swallowing but not aspirating or choking on food. Reports normal gait, normal bowel movements and normal bowel and bladder control. Denies trauma, fall, recent illness or exposure to any known illnesses. No other complaints.

## 2024-04-05 ENCOUNTER — NON-APPOINTMENT (OUTPATIENT)
Age: 39
End: 2024-04-05

## 2024-04-05 LAB
BACTERIA THROAT CULT: NORMAL
BASOPHILS # BLD AUTO: 0.07 K/UL
BASOPHILS NFR BLD AUTO: 1.2 %
EOSINOPHIL # BLD AUTO: 0.33 K/UL
EOSINOPHIL NFR BLD AUTO: 5.7 %
HCT VFR BLD CALC: 47.4 %
HGB BLD-MCNC: 15.6 G/DL
IMM GRANULOCYTES NFR BLD AUTO: 1.2 %
LYMPHOCYTES # BLD AUTO: 1.52 K/UL
LYMPHOCYTES NFR BLD AUTO: 26.5 %
MAN DIFF?: NORMAL
MCHC RBC-ENTMCNC: 29.8 PG
MCHC RBC-ENTMCNC: 32.9 GM/DL
MCV RBC AUTO: 90.6 FL
MONOCYTES # BLD AUTO: 0.6 K/UL
MONOCYTES NFR BLD AUTO: 10.5 %
NEUTROPHILS # BLD AUTO: 3.15 K/UL
NEUTROPHILS NFR BLD AUTO: 54.9 %
PLATELET # BLD AUTO: 206 K/UL
RBC # BLD: 5.23 M/UL
RBC # FLD: 13.2 %
WBC # FLD AUTO: 5.74 K/UL

## 2024-07-18 ENCOUNTER — APPOINTMENT (OUTPATIENT)
Dept: FAMILY MEDICINE | Facility: CLINIC | Age: 39
End: 2024-07-18

## 2024-08-14 NOTE — ED ADULT NURSE NOTE - NS ED NURSE RECORD ANOTHER HT AND WT
----- Message from Francisca Chopra sent at 8/14/2024  7:47 AM CDT -----  Contact: [facility]  Type:  RX Refill Request    Who Called: [facility]  Refill or New Rx:refill  RX Name and Strength:Mupirocin 3% cream  How is the patient currently taking it? (ex. 1XDay):Once in the morning and once at night  Is this a 30 day or 90 day RX:90  [facility] with phone number[facility] [place] [facility] [address] [address] [facility] [address]  Phone: [phone] Fax: [phone]  Local or Mail Order:local  Ordering Provider:Desiree Barkley  Would the patient rather a call back or a response via [facility]? call  Best Call Back Number:[phone]   Additional Information: Patient reports receiving prescription at a walk-in clinic and request to receive a refill and to receive an oral medication as well. Please give patient a call back to assist.   Thank you,  [facility]   Yes

## 2024-12-19 NOTE — PATIENT PROFILE ADULT - BRADEN SENSORY
Anesthesia Post Evaluation    Patient: Nico Orozco    Procedure(s) Performed: Procedure(s) (LRB):  COLONOSCOPY, SCREENING, HIGH RISK PATIENT (N/A)    Final Anesthesia Type: general      Patient location during evaluation: PACU  Patient participation: Yes- Able to Participate  Level of consciousness: awake and alert  Post-procedure vital signs: reviewed and stable  Pain management: adequate  Airway patency: patent    PONV status: None or treated.  Anesthetic complications: no      Cardiovascular status: hemodynamically stable  Respiratory status: spontaneous ventilation  Hydration status: euvolemic  Follow-up not needed.          Vitals Value Taken Time   /90 12/19/24 1313   Temp 36.6 °C (97.9 °F) 12/19/24 1243   Pulse 56 12/19/24 1313   Resp 16 12/19/24 1313   SpO2 98 % 12/19/24 1313         No case tracking events are documented in the log.      Pain/Bradley Score: Bradley Score: 10 (12/19/2024 12:58 PM)           (4) no impairment

## 2025-06-16 ENCOUNTER — TRANSCRIPTION ENCOUNTER (OUTPATIENT)
Age: 40
End: 2025-06-16